# Patient Record
Sex: MALE | Race: BLACK OR AFRICAN AMERICAN | NOT HISPANIC OR LATINO | URBAN - METROPOLITAN AREA
[De-identification: names, ages, dates, MRNs, and addresses within clinical notes are randomized per-mention and may not be internally consistent; named-entity substitution may affect disease eponyms.]

---

## 2017-09-15 ENCOUNTER — OUTPATIENT (OUTPATIENT)
Dept: OUTPATIENT SERVICES | Facility: HOSPITAL | Age: 52
LOS: 1 days | End: 2017-09-15
Payer: COMMERCIAL

## 2017-09-15 VITALS
DIASTOLIC BLOOD PRESSURE: 82 MMHG | HEART RATE: 54 BPM | TEMPERATURE: 98 F | SYSTOLIC BLOOD PRESSURE: 130 MMHG | RESPIRATION RATE: 16 BRPM | HEIGHT: 73 IN | WEIGHT: 229.06 LBS

## 2017-09-15 DIAGNOSIS — Z01.818 ENCOUNTER FOR OTHER PREPROCEDURAL EXAMINATION: ICD-10-CM

## 2017-09-15 DIAGNOSIS — Z98.890 OTHER SPECIFIED POSTPROCEDURAL STATES: Chronic | ICD-10-CM

## 2017-09-15 DIAGNOSIS — M25.561 PAIN IN RIGHT KNEE: ICD-10-CM

## 2017-09-15 DIAGNOSIS — S82.891A OTHER FRACTURE OF RIGHT LOWER LEG, INITIAL ENCOUNTER FOR CLOSED FRACTURE: Chronic | ICD-10-CM

## 2017-09-15 PROCEDURE — 93005 ELECTROCARDIOGRAM TRACING: CPT

## 2017-09-15 PROCEDURE — G0463: CPT

## 2017-09-15 PROCEDURE — 93010 ELECTROCARDIOGRAM REPORT: CPT | Mod: NC

## 2017-09-15 NOTE — H&P PST ADULT - NEUROLOGICAL COMMENTS
Had left sided facial numbness 8/27 went [facility]; Full neuro and blood workup was negative.

## 2017-09-15 NOTE — H&P PST ADULT - HISTORY OF PRESENT ILLNESS
51 yo male presents to [facility] scheduled for right knee arthroscopy on 9/27 with Dr. Arita.  Reports injuring his right knee while working as an . Has had right knee pain since Jan 2017. Rates his pain an 7/10. Denies use of pain meds. 51 yo athletic male presents to PST scheduled for right knee arthroscopy on 9/27 with Dr. Arita.  Reports injuring his right knee while working as an . Has had right knee pain since Jan 2017. Rates his pain an 7/10. Denies use of pain meds.

## 2017-09-15 NOTE — H&P PST ADULT - PMH
MVP (mitral valve prolapse)  "slight"  Right knee pain, unspecified chronicity Facial palsy    MVP (mitral valve prolapse)  "slight"  Right knee pain, unspecified chronicity

## 2017-09-15 NOTE — H&P PST ADULT - ASSESSMENT
scheduled for right knee arthroscopy on 9/27 with Dr. Arita. 51 yo male with right knee pain scheduled for right knee arthroscopy on 9/27 with Dr. Arita.

## 2017-09-15 NOTE — H&P PST ADULT - PSH
S/P colonoscopy with polypectomy Fracture of right ankle  s/p repair 1997  History of knee surgery  Open left knee 1985  S/P arthroscopy of left knee  2013  S/P arthroscopy of right knee  2002  S/P arthroscopy of shoulder  right 2000  S/P colonoscopy with polypectomy

## 2017-09-15 NOTE — H&P PST ADULT - PROBLEM SELECTOR PLAN 2
Labs (CBC, BMP) on chart from 8/27. EKG pending.   MC with Dr. Brian Luis  Pre op instructions reviewed and given. Hold OTC supplements 5 days pre op. Verbalized understanding.

## 2017-09-15 NOTE — H&P PST ADULT - NSANTHOSAYNRD_GEN_A_CORE
No. MANJU screening performed.  STOP BANG Legend: 0-2 = LOW Risk; 3-4 = INTERMEDIATE Risk; 5-8 = HIGH Risk

## 2017-09-20 ENCOUNTER — TRANSCRIPTION ENCOUNTER (OUTPATIENT)
Age: 52
End: 2017-09-20

## 2017-09-26 RX ORDER — SODIUM CHLORIDE 9 MG/ML
3 INJECTION INTRAMUSCULAR; INTRAVENOUS; SUBCUTANEOUS ONCE
Qty: 0 | Refills: 0 | Status: DISCONTINUED | OUTPATIENT
Start: 2017-09-27 | End: 2017-10-12

## 2017-09-26 RX ORDER — SODIUM CHLORIDE 9 MG/ML
1000 INJECTION, SOLUTION INTRAVENOUS
Qty: 0 | Refills: 0 | Status: DISCONTINUED | OUTPATIENT
Start: 2017-09-27 | End: 2017-09-27

## 2017-09-27 ENCOUNTER — OUTPATIENT (OUTPATIENT)
Dept: OUTPATIENT SERVICES | Facility: HOSPITAL | Age: 52
LOS: 1 days | Discharge: HOME | End: 2017-09-27
Payer: COMMERCIAL

## 2017-09-27 ENCOUNTER — TRANSCRIPTION ENCOUNTER (OUTPATIENT)
Age: 52
End: 2017-09-27

## 2017-09-27 ENCOUNTER — RESULT REVIEW (OUTPATIENT)
Age: 52
End: 2017-09-27

## 2017-09-27 VITALS
OXYGEN SATURATION: 100 % | DIASTOLIC BLOOD PRESSURE: 83 MMHG | HEART RATE: 60 BPM | WEIGHT: 229.06 LBS | RESPIRATION RATE: 16 BRPM | HEIGHT: 73 IN | SYSTOLIC BLOOD PRESSURE: 133 MMHG | TEMPERATURE: 98 F

## 2017-09-27 VITALS
DIASTOLIC BLOOD PRESSURE: 67 MMHG | RESPIRATION RATE: 13 BRPM | SYSTOLIC BLOOD PRESSURE: 111 MMHG | OXYGEN SATURATION: 98 % | HEART RATE: 60 BPM

## 2017-09-27 DIAGNOSIS — M25.561 PAIN IN RIGHT KNEE: ICD-10-CM

## 2017-09-27 DIAGNOSIS — Z01.818 ENCOUNTER FOR OTHER PREPROCEDURAL EXAMINATION: ICD-10-CM

## 2017-09-27 DIAGNOSIS — S82.891A OTHER FRACTURE OF RIGHT LOWER LEG, INITIAL ENCOUNTER FOR CLOSED FRACTURE: Chronic | ICD-10-CM

## 2017-09-27 DIAGNOSIS — Z98.890 OTHER SPECIFIED POSTPROCEDURAL STATES: Chronic | ICD-10-CM

## 2017-09-27 PROCEDURE — 88304 TISSUE EXAM BY PATHOLOGIST: CPT

## 2017-09-27 PROCEDURE — 29880 ARTHRS KNE SRG MNISECTMY M&L: CPT | Mod: RT

## 2017-09-27 PROCEDURE — 88304 TISSUE EXAM BY PATHOLOGIST: CPT | Mod: 26

## 2017-09-27 RX ORDER — ONDANSETRON 8 MG/1
4 TABLET, FILM COATED ORAL ONCE
Qty: 0 | Refills: 0 | Status: DISCONTINUED | OUTPATIENT
Start: 2017-09-27 | End: 2017-09-27

## 2017-09-27 RX ORDER — OXYCODONE AND ACETAMINOPHEN 5; 325 MG/1; MG/1
1 TABLET ORAL ONCE
Qty: 0 | Refills: 0 | Status: DISCONTINUED | OUTPATIENT
Start: 2017-09-27 | End: 2017-09-27

## 2017-09-27 RX ORDER — SODIUM CHLORIDE 9 MG/ML
1000 INJECTION, SOLUTION INTRAVENOUS
Qty: 0 | Refills: 0 | Status: DISCONTINUED | OUTPATIENT
Start: 2017-09-27 | End: 2017-09-27

## 2017-09-27 RX ORDER — OXYCODONE AND ACETAMINOPHEN 5; 325 MG/1; MG/1
2 TABLET ORAL ONCE
Qty: 0 | Refills: 0 | Status: DISCONTINUED | OUTPATIENT
Start: 2017-09-27 | End: 2017-09-27

## 2017-09-27 RX ORDER — SODIUM CHLORIDE 9 MG/ML
1000 INJECTION, SOLUTION INTRAVENOUS
Qty: 0 | Refills: 0 | Status: DISCONTINUED | OUTPATIENT
Start: 2017-09-27 | End: 2017-10-12

## 2017-09-27 RX ORDER — OXYCODONE AND ACETAMINOPHEN 5; 325 MG/1; MG/1
1 TABLET ORAL
Qty: 30 | Refills: 0
Start: 2017-09-27

## 2017-09-27 RX ORDER — HYDROMORPHONE HYDROCHLORIDE 2 MG/ML
0.5 INJECTION INTRAMUSCULAR; INTRAVENOUS; SUBCUTANEOUS
Qty: 0 | Refills: 0 | Status: DISCONTINUED | OUTPATIENT
Start: 2017-09-27 | End: 2017-09-27

## 2017-09-27 RX ORDER — CEFAZOLIN SODIUM 1 G
2000 VIAL (EA) INJECTION ONCE
Qty: 0 | Refills: 0 | Status: COMPLETED | OUTPATIENT
Start: 2017-09-27 | End: 2017-09-27

## 2017-09-27 RX ADMIN — HYDROMORPHONE HYDROCHLORIDE 0.5 MILLIGRAM(S): 2 INJECTION INTRAMUSCULAR; INTRAVENOUS; SUBCUTANEOUS at 13:14

## 2017-09-27 RX ADMIN — SODIUM CHLORIDE 30 MILLILITER(S): 9 INJECTION, SOLUTION INTRAVENOUS at 10:30

## 2017-09-27 RX ADMIN — HYDROMORPHONE HYDROCHLORIDE 0.5 MILLIGRAM(S): 2 INJECTION INTRAMUSCULAR; INTRAVENOUS; SUBCUTANEOUS at 13:04

## 2017-09-27 RX ADMIN — SODIUM CHLORIDE 75 MILLILITER(S): 9 INJECTION, SOLUTION INTRAVENOUS at 13:04

## 2017-09-27 NOTE — ASU DISCHARGE PLAN (ADULT/PEDIATRIC). - ACTIVITY LEVEL
Weight bearing as tolerated Right Lower Extremity with assistive devices as needed/no heavy lifting/quiet play/no exercise/no tub baths/elevate extremity/weight bearing as tolerated/no sports/gym

## 2017-09-27 NOTE — BRIEF OPERATIVE NOTE - OPERATION/FINDINGS
Grade IV Changes to Patellofemoral Compartment, Degenerative Medial Meniscus tear, Posterior Horn Lateral Meniscus Fraying

## 2017-09-27 NOTE — ASU PATIENT PROFILE, ADULT - PSH
Fracture of right ankle  s/p repair 1997  History of knee surgery  Open left knee 1985  S/P arthroscopy of left knee  2013  S/P arthroscopy of right knee  2002  S/P arthroscopy of shoulder  right 2000  S/P colonoscopy with polypectomy

## 2017-09-27 NOTE — ASU DISCHARGE PLAN (ADULT/PEDIATRIC). - ITEMS TO FOLLOWUP WITH YOUR PHYSICIAN'S
signs and symptoms of infection. activity restrictions, pain  medications and its indications for use.  Patient and wife verbalized understanding of teachings, and assures compliance

## 2017-09-27 NOTE — ASU DISCHARGE PLAN (ADULT/PEDIATRIC). - NOTIFY
Bleeding that does not stop/Numbness, tingling/Pain not relieved by Medications/Swelling that continues/Numbness, color, or temperature change to extremity/Fever greater than 101

## 2017-09-27 NOTE — ASU DISCHARGE PLAN (ADULT/PEDIATRIC). - MEDICATION SUMMARY - MEDICATIONS TO TAKE
I will START or STAY ON the medications listed below when I get home from the hospital:    acetaminophen-oxyCODONE 325 mg-5 mg oral tablet  -- 1 tab(s) by mouth every 4 hours, As Needed MDD:6 - for severe pain  -- Caution federal law prohibits the transfer of this drug to any person other  than the person for whom it was prescribed.  May cause drowsiness.  Alcohol may intensify this effect.  Use care when operating dangerous machinery.  This prescription cannot be refilled.  This product contains acetaminophen.  Do not use  with any other product containing acetaminophen to prevent possible liver damage.  Using more of this medication than prescribed may cause serious breathing problems.    -- Indication: For Severe pain    lysine 500 mg oral tablet  -- 1 tab(s) by mouth once a day  -- Indication: For per PMD    Therapeutic Multiple Vitamins oral tablet  -- 1 tab(s) by mouth once a day  -- Indication: For per PMD

## 2017-09-27 NOTE — ASU DISCHARGE PLAN (ADULT/PEDIATRIC). - INSTRUCTIONS
Please FU in 10-14 days. Please call for appt. Resume normal diet. Please avoid alcohol when taking pain medications

## 2017-09-27 NOTE — BRIEF OPERATIVE NOTE - PROCEDURE
<<-----Click on this checkbox to enter Procedure Knee arthroscopy, right  09/27/2017  Partial Medial/Lateral Menisectomy, synovectomy, Trochlear Chondroplasty  Active  [id number]

## 2017-09-28 LAB — SURGICAL PATHOLOGY FINAL REPORT - CH: SIGNIFICANT CHANGE UP
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
## 2017-09-30 DIAGNOSIS — M94.261 CHONDROMALACIA, RIGHT KNEE: ICD-10-CM
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
## 2017-09-30 DIAGNOSIS — S83.241A OTHER TEAR OF MEDIAL MENISCUS, CURRENT INJURY, RIGHT KNEE, INITIAL ENCOUNTER: ICD-10-CM

## 2017-09-30 DIAGNOSIS — M17.11 UNILATERAL PRIMARY OSTEOARTHRITIS, RIGHT KNEE: ICD-10-CM
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
## 2017-09-30 DIAGNOSIS — M65.861 OTHER SYNOVITIS AND TENOSYNOVITIS, RIGHT LOWER LEG: ICD-10-CM

## 2017-09-30 DIAGNOSIS — J30.2 OTHER SEASONAL ALLERGIC RHINITIS: ICD-10-CM

## 2017-09-30 DIAGNOSIS — S83.281A OTHER TEAR OF LATERAL MENISCUS, CURRENT INJURY, RIGHT KNEE, INITIAL ENCOUNTER: ICD-10-CM

## 2019-11-30 ENCOUNTER — TRANSCRIPTION ENCOUNTER (OUTPATIENT)
Age: 54
End: 2019-11-30

## 2023-05-08 ENCOUNTER — NON-APPOINTMENT (OUTPATIENT)
Age: 58
End: 2023-05-08

## 2023-05-20 ENCOUNTER — INPATIENT (INPATIENT)
Facility: HOSPITAL | Age: 58
LOS: 2 days | Discharge: HOME | DRG: 123 | End: 2023-05-23
Attending: INTERNAL MEDICINE | Admitting: INTERNAL MEDICINE
Payer: COMMERCIAL

## 2023-05-20 VITALS
TEMPERATURE: 98 F | HEART RATE: 64 BPM | WEIGHT: 207.01 LBS | HEIGHT: 73 IN | DIASTOLIC BLOOD PRESSURE: 94 MMHG | OXYGEN SATURATION: 98 % | RESPIRATION RATE: 18 BRPM | SYSTOLIC BLOOD PRESSURE: 165 MMHG

## 2023-05-20 DIAGNOSIS — Z98.890 OTHER SPECIFIED POSTPROCEDURAL STATES: Chronic | ICD-10-CM

## 2023-05-20 DIAGNOSIS — S82.891A OTHER FRACTURE OF RIGHT LOWER LEG, INITIAL ENCOUNTER FOR CLOSED FRACTURE: Chronic | ICD-10-CM

## 2023-05-20 DIAGNOSIS — H53.9 UNSPECIFIED VISUAL DISTURBANCE: ICD-10-CM

## 2023-05-20 PROBLEM — M25.561 PAIN IN RIGHT KNEE: Chronic | Status: ACTIVE | Noted: 2017-09-15

## 2023-05-20 PROBLEM — I34.1 NONRHEUMATIC MITRAL (VALVE) PROLAPSE: Chronic | Status: ACTIVE | Noted: 2017-09-15

## 2023-05-20 PROBLEM — G51.0 BELL'S PALSY: Chronic | Status: ACTIVE | Noted: 2017-09-15

## 2023-05-20 LAB
ALBUMIN SERPL ELPH-MCNC: 4.1 G/DL — SIGNIFICANT CHANGE UP (ref 3.3–5)
ALP SERPL-CCNC: 85 U/L — SIGNIFICANT CHANGE UP (ref 40–120)
ALT FLD-CCNC: 19 U/L — SIGNIFICANT CHANGE UP (ref 10–45)
ANION GAP SERPL CALC-SCNC: 12 MMOL/L — SIGNIFICANT CHANGE UP (ref 5–17)
APTT BLD: 23.9 SEC — LOW (ref 27.5–35.5)
AST SERPL-CCNC: 18 U/L — SIGNIFICANT CHANGE UP (ref 10–40)
BASOPHILS # BLD AUTO: 0.02 K/UL — SIGNIFICANT CHANGE UP (ref 0–0.2)
BASOPHILS NFR BLD AUTO: 0.2 % — SIGNIFICANT CHANGE UP (ref 0–2)
BILIRUB SERPL-MCNC: 0.3 MG/DL — SIGNIFICANT CHANGE UP (ref 0.2–1.2)
BUN SERPL-MCNC: 15 MG/DL — SIGNIFICANT CHANGE UP (ref 7–23)
CALCIUM SERPL-MCNC: 9.2 MG/DL — SIGNIFICANT CHANGE UP (ref 8.4–10.5)
CHLORIDE SERPL-SCNC: 101 MMOL/L — SIGNIFICANT CHANGE UP (ref 96–108)
CO2 SERPL-SCNC: 25 MMOL/L — SIGNIFICANT CHANGE UP (ref 22–31)
CREAT SERPL-MCNC: 0.98 MG/DL — SIGNIFICANT CHANGE UP (ref 0.5–1.3)
EGFR: 90 ML/MIN/1.73M2 — SIGNIFICANT CHANGE UP
EOSINOPHIL # BLD AUTO: 0.01 K/UL — SIGNIFICANT CHANGE UP (ref 0–0.5)
EOSINOPHIL NFR BLD AUTO: 0.1 % — SIGNIFICANT CHANGE UP (ref 0–6)
GLUCOSE SERPL-MCNC: 122 MG/DL — HIGH (ref 70–99)
HCT VFR BLD CALC: 42.9 % — SIGNIFICANT CHANGE UP (ref 39–50)
HGB BLD-MCNC: 14.4 G/DL — SIGNIFICANT CHANGE UP (ref 13–17)
IMM GRANULOCYTES NFR BLD AUTO: 1 % — HIGH (ref 0–0.9)
INR BLD: 1.14 RATIO — SIGNIFICANT CHANGE UP (ref 0.88–1.16)
LYMPHOCYTES # BLD AUTO: 0.54 K/UL — LOW (ref 1–3.3)
LYMPHOCYTES # BLD AUTO: 5.7 % — LOW (ref 13–44)
MCHC RBC-ENTMCNC: 31.2 PG — SIGNIFICANT CHANGE UP (ref 27–34)
MCHC RBC-ENTMCNC: 33.6 GM/DL — SIGNIFICANT CHANGE UP (ref 32–36)
MCV RBC AUTO: 92.9 FL — SIGNIFICANT CHANGE UP (ref 80–100)
MONOCYTES # BLD AUTO: 0.73 K/UL — SIGNIFICANT CHANGE UP (ref 0–0.9)
MONOCYTES NFR BLD AUTO: 7.7 % — SIGNIFICANT CHANGE UP (ref 2–14)
NEUTROPHILS # BLD AUTO: 8.07 K/UL — HIGH (ref 1.8–7.4)
NEUTROPHILS NFR BLD AUTO: 85.3 % — HIGH (ref 43–77)
NRBC # BLD: 0 /100 WBCS — SIGNIFICANT CHANGE UP (ref 0–0)
PLATELET # BLD AUTO: 301 K/UL — SIGNIFICANT CHANGE UP (ref 150–400)
POTASSIUM SERPL-MCNC: 3.6 MMOL/L — SIGNIFICANT CHANGE UP (ref 3.5–5.3)
POTASSIUM SERPL-SCNC: 3.6 MMOL/L — SIGNIFICANT CHANGE UP (ref 3.5–5.3)
PROT SERPL-MCNC: 7.5 G/DL — SIGNIFICANT CHANGE UP (ref 6–8.3)
PROTHROM AB SERPL-ACNC: 13.1 SEC — SIGNIFICANT CHANGE UP (ref 10.5–13.4)
RBC # BLD: 4.62 M/UL — SIGNIFICANT CHANGE UP (ref 4.2–5.8)
RBC # FLD: 12 % — SIGNIFICANT CHANGE UP (ref 10.3–14.5)
SODIUM SERPL-SCNC: 138 MMOL/L — SIGNIFICANT CHANGE UP (ref 135–145)
WBC # BLD: 9.46 K/UL — SIGNIFICANT CHANGE UP (ref 3.8–10.5)
WBC # FLD AUTO: 9.46 K/UL — SIGNIFICANT CHANGE UP (ref 3.8–10.5)

## 2023-05-20 PROCEDURE — 99285 EMERGENCY DEPT VISIT HI MDM: CPT

## 2023-05-20 PROCEDURE — 70498 CT ANGIOGRAPHY NECK: CPT | Mod: 26

## 2023-05-20 PROCEDURE — 70496 CT ANGIOGRAPHY HEAD: CPT | Mod: 26

## 2023-05-20 RX ORDER — LACTOBACILLUS ACIDOPHILUS 100MM CELL
1 CAPSULE ORAL
Refills: 0 | DISCHARGE

## 2023-05-20 RX ORDER — LANOLIN ALCOHOL/MO/W.PET/CERES
1 CREAM (GRAM) TOPICAL
Qty: 0 | Refills: 0 | DISCHARGE

## 2023-05-20 RX ORDER — ACETAMINOPHEN 500 MG
2 TABLET ORAL
Refills: 0 | DISCHARGE

## 2023-05-20 RX ORDER — PANTOPRAZOLE SODIUM 20 MG/1
40 TABLET, DELAYED RELEASE ORAL
Refills: 0 | Status: DISCONTINUED | OUTPATIENT
Start: 2023-05-20 | End: 2023-05-23

## 2023-05-20 RX ORDER — PREGABALIN 225 MG/1
1 CAPSULE ORAL
Refills: 0 | DISCHARGE

## 2023-05-20 RX ADMIN — Medication 250 MILLIGRAM(S): at 18:13

## 2023-05-20 RX ADMIN — Medication 100 MILLIGRAM(S): at 15:58

## 2023-05-20 NOTE — ED PROVIDER NOTE - ATTENDING APP SHARED VISIT CONTRIBUTION OF CARE
Private Physician Crystal, PCP, Ivania Ernandez Optho/plastics  57y male pmh neg. PT had c/o ha for past 1.5w, Seen by ENT and had elevated crp/esr. Referred to optho for bx  as of 4d ago. Had bx 3 rt temp art. Had prompt relief  w steroids, Now comes to ed c/o rt eye Shade in rt eye lasted approx one min. "I could only see the ceiling out of my rt eye" Private Physician Crystal, PCP, Ivania Ernandez Optho/plastics  57y male pmh neg. PT had c/o ha for past 1.5w, Seen by ENT and had elevated crp/esr. Referred to optho for bx  as of 4d ago. Had bx 3 rt temp art. Had prompt relief  w steroids, Now comes to ed c/o rt eye Shade in rt eye lasted approx one min. "I could only see the ceiling out of my rt eye" PE WDWN male awake alert bandage rt temple. Heent normocephalic atraumatic neck supple chest clear anterior & posterior cv no rubs, gallops or murmurs abd soft +bs no mass guarding neuro no focal defects, CN2-12 intact, power 5/5  all extr gcs 15 speech fluent.  Pratik Thayer MD, Facep

## 2023-05-20 NOTE — CONSULT NOTE ADULT - SUBJECTIVE AND OBJECTIVE BOX
[facility] DEPARTMENT OF OPHTHALMOLOGY - INITIAL ADULT CONSULT  ----------------------------------------------------------------------------------------------------  Evelin De La Cruz MD, PGY-1  -------------------------------------------------------------------------------------------------    HPI: 56 yo M with no significant POHx, currently undergoing work-up for GCA, s/p temporal artery biopsy 5/17 with Dr. Ernandez presenting to the ED for visual loss in the right eye. Pt states that around 10 AM today, he developed a "shade/curtain" over his vision starting from his inferior visual field and extending upwards, excluding the most superior portion of his vision. Adds that episode last around 40 seconds. Reports that he has had previous similar episodes over the past 1-2 years (2-4 episodes). States that 1.5 weeks ago, started developing severe bilateral headache, scalp tenderness and periorbital "pressure" around the right eye. Went to PCP and was found to have elevated inflammatory markers. Was started on prednisone for empiric treatment of GCA on 5/16 with resolution of symptoms. Subsequently had temporal artery biopsy performed on 5/17 with results pending. No personal or family hx of rheumatological diseases.       PAST MEDICAL & SURGICAL HISTORY:  MVP (mitral valve prolapse)  "slight"      Right knee pain, unspecified chronicity      Facial palsy      S/P colonoscopy with polypectomy      S/P arthroscopy of left knee  2013      S/P arthroscopy of right knee  2002      History of knee surgery  Open left knee 1985      S/P arthroscopy of shoulder  right 2000      Fracture of right ankle  s/p repair 1997        Past Ocular History: none   Ophthalmic Medications: none  FAMILY HISTORY: no Fhx of rheumatological disease. No Fhx of glaucoma/ARMD    Social History: no alcohol, tobacco, or illicit substance use    MEDICATIONS  (STANDING):    MEDICATIONS  (PRN):    Allergies & Intolerances:   dust (Sneezing; Rhinorrhea)  Grass; Mold (Sneezing; Rhinorrhea)  No Known Drug Allergies    Review of Systems:  Constitutional: No fever, chills  Eyes: No blurry vision, flashes, floaters, FBS, erythema, discharge, double vision, OU  Neuro: No tremors  Cardiovascular: No chest pain, palpitations  Respiratory: No SOB, no cough  GI: No nausea, vomiting, abdominal pain  : No dysuria  Skin: no rash  Psych: no depression  Endocrine: no polyuria, polydipsia  Heme/lymph: no swelling    VITALS: T(C): 36.8 (05-20-23 @ 12:01)  T(F): 98.2 (05-20-23 @ 12:01), Max: 98.2 (05-20-23 @ 12:01)  HR: 64 (05-20-23 @ 12:01) (64 - 64)  BP: 165/94 (05-20-23 @ 12:01) (165/94 - 165/94)  RR:  (18 - 18)  SpO2:  (98% - 98%)  Wt(kg): --  General: AAO x 3, appropriate mood and affect    Ophthalmology Exam:  Visual acuity (sc): 20/20 OU  Pupils: PERRL OU, no APD  Ttono: 16 OS, 13 OS   Extraocular movements (EOMs): Full OU, no pain, no diplopia  Confrontational Visual Field (CVF): Full OU  Color Plates: 12/12 OU    Pen Light Exam (PLE)  External: Flat OU  Lids/Lashes/Lacrimal Ducts: Flat OU    Sclera/Conjunctiva: W+Q OU  Cornea: Cl OU  Anterior Chamber: D+F OU    Iris: Flat OU  Lens: Cl OU    Fundus Exam: dilated with 1% tropicamide and 2.5% phenylephrine  Approval obtained from primary team for dilation  Patient aware that pupils can remained dilated for at least 4-6 hours  Exam performed with 20D lens    Vitreous: wnl OU  Disc, cup/disc: sharp and pink, 0.4 OU  Macula: wnl OU  Vessels: wnl OU  Periphery: wnl OU    Labs/Imaging:  ***   [facility] DEPARTMENT OF OPHTHALMOLOGY - INITIAL ADULT CONSULT  ----------------------------------------------------------------------------------------------------  Evelin De La Cruz MD, PGY-1  -------------------------------------------------------------------------------------------------    HPI: 58 yo M with no significant POHx, currently undergoing work-up for GCA, s/p temporal artery biopsy 5/17 with Dr. Ernandez presenting to the ED for visual loss in the right eye. Pt states that around 10 AM today, he developed a "shade/curtain" over his vision starting from his inferior visual field and extending upwards, excluding the most superior portion of his vision. Adds that episode last around 40 seconds. Reports that he has had previous similar episodes over the past 1-2 years (2-4 episodes) alternating between both eyes. States that 1.5 weeks ago, started developing severe bilateral headache, scalp tenderness and periorbital "pressure" around the right eye. Went to PCP and was found to have elevated inflammatory markers. Was started on 60mg prednisone for empiric treatment of GCA on 5/16 with resolution of symptoms. Subsequently had temporal artery biopsy performed on 5/17 with results pending. No personal or family hx of rheumatological diseases.       PAST MEDICAL & SURGICAL HISTORY:  MVP (mitral valve prolapse)  "slight"      Right knee pain, unspecified chronicity      Facial palsy      S/P colonoscopy with polypectomy      S/P arthroscopy of left knee  2013      S/P arthroscopy of right knee  2002      History of knee surgery  Open left knee 1985      S/P arthroscopy of shoulder  right 2000      Fracture of right ankle  s/p repair 1997        Past Ocular History: none   Ophthalmic Medications: none  FAMILY HISTORY: no Fhx of rheumatological disease. No Fhx of glaucoma/ARMD    Social History: no alcohol, tobacco, or illicit substance use    MEDICATIONS  (STANDING):    MEDICATIONS  (PRN):    Allergies & Intolerances:   dust (Sneezing; Rhinorrhea)  Grass; Mold (Sneezing; Rhinorrhea)  No Known Drug Allergies    Review of Systems:  Constitutional: No fever, chills  Eyes: No blurry vision, flashes, floaters, FBS, erythema, discharge, double vision, OU  Neuro: No tremors  Cardiovascular: No chest pain, palpitations  Respiratory: No SOB, no cough  GI: No nausea, vomiting, abdominal pain  : No dysuria  Skin: no rash  Psych: no depression  Endocrine: no polyuria, polydipsia  Heme/lymph: no swelling    VITALS: T(C): 36.8 (05-20-23 @ 12:01)  T(F): 98.2 (05-20-23 @ 12:01), Max: 98.2 (05-20-23 @ 12:01)  HR: 64 (05-20-23 @ 12:01) (64 - 64)  BP: 165/94 (05-20-23 @ 12:01) (165/94 - 165/94)  RR:  (18 - 18)  SpO2:  (98% - 98%)  Wt(kg): --  General: AAO x 3, appropriate mood and affect    Ophthalmology Exam:  Visual acuity (sc): 20/20 OU  Pupils: PERRL OU, no APD  Ttono: 16 OS, 13 OS   Extraocular movements (EOMs): Full OU, no pain, no diplopia  Confrontational Visual Field (CVF): Full OU  Color Plates: 12/12 OU    Pen Light Exam (PLE)  External: Flat OU  Lids/Lashes/Lacrimal Ducts: Flat OU    Sclera/Conjunctiva: W+Q OU  Cornea: Cl OU  Anterior Chamber: D+F OU    Iris: Flat OU  Lens: Cl OU    Fundus Exam: dilated with 1% tropicamide and 2.5% phenylephrine  Approval obtained from primary team for dilation  Patient aware that pupils can remained dilated for at least 4-6 hours  Exam performed with 20D lens    Vitreous: wnl OU  Disc, cup/disc: sharp and pink, 0.3 OU  Macula: wnl OU  Vessels: wnl OU  Periphery: wnl OU   [facility] DEPARTMENT OF OPHTHALMOLOGY - INITIAL ADULT CONSULT  ----------------------------------------------------------------------------------------------------  Evelin De La Cruz MD, PGY-1  -------------------------------------------------------------------------------------------------    HPI: 58 yo M with no significant POHx, currently undergoing work-up for GCA, s/p temporal artery biopsy 5/17 with Dr. Ernandez presenting to the ED for visual loss in the right eye. Pt states that around 10 AM today, he developed a "shade/curtain" over his vision starting from his inferior visual field and extending upwards, excluding the most superior portion of his vision. Adds that episode last around 40 seconds. Reports that he has had previous similar episodes over the past 1-2 years (2-4 episodes). States that 1.5 weeks ago, started developing severe bilateral headache, scalp tenderness and periorbital "pressure" around the right eye. Went to PCP and was found to have elevated inflammatory markers. Was started on 60mg prednisone for empiric treatment of GCA on 5/16 with resolution of symptoms. Subsequently had temporal artery biopsy performed on 5/17 with results pending. No personal or family hx of rheumatological diseases.       PAST MEDICAL & SURGICAL HISTORY:  MVP (mitral valve prolapse)  "slight"      Right knee pain, unspecified chronicity      Facial palsy      S/P colonoscopy with polypectomy      S/P arthroscopy of left knee  2013      S/P arthroscopy of right knee  2002      History of knee surgery  Open left knee 1985      S/P arthroscopy of shoulder  right 2000      Fracture of right ankle  s/p repair 1997        Past Ocular History: none   Ophthalmic Medications: none  FAMILY HISTORY: no Fhx of rheumatological disease. No Fhx of glaucoma/ARMD    Social History: no alcohol, tobacco, or illicit substance use    MEDICATIONS  (STANDING):    MEDICATIONS  (PRN):    Allergies & Intolerances:   dust (Sneezing; Rhinorrhea)  Grass; Mold (Sneezing; Rhinorrhea)  No Known Drug Allergies    Review of Systems:  Constitutional: No fever, chills  Eyes: No blurry vision, flashes, floaters, FBS, erythema, discharge, double vision, OU  Neuro: No tremors  Cardiovascular: No chest pain, palpitations  Respiratory: No SOB, no cough  GI: No nausea, vomiting, abdominal pain  : No dysuria  Skin: no rash  Psych: no depression  Endocrine: no polyuria, polydipsia  Heme/lymph: no swelling    VITALS: T(C): 36.8 (05-20-23 @ 12:01)  T(F): 98.2 (05-20-23 @ 12:01), Max: 98.2 (05-20-23 @ 12:01)  HR: 64 (05-20-23 @ 12:01) (64 - 64)  BP: 165/94 (05-20-23 @ 12:01) (165/94 - 165/94)  RR:  (18 - 18)  SpO2:  (98% - 98%)  Wt(kg): --  General: AAO x 3, appropriate mood and affect    Ophthalmology Exam:  Visual acuity (sc): 20/20 OU  Pupils: PERRL OU, no APD  Ttono: 16 OS, 13 OS   Extraocular movements (EOMs): Full OU, no pain, no diplopia  Confrontational Visual Field (CVF): Full OU  Color Plates: 12/12 OU    Pen Light Exam (PLE)  External: Flat OU  Lids/Lashes/Lacrimal Ducts: Flat OU    Sclera/Conjunctiva: W+Q OU  Cornea: Cl OU  Anterior Chamber: D+F OU    Iris: Flat OU  Lens: Cl OU    Fundus Exam: dilated with 1% tropicamide and 2.5% phenylephrine  Approval obtained from primary team for dilation  Patient aware that pupils can remained dilated for at least 4-6 hours  Exam performed with 20D lens    Vitreous: wnl OU  Disc, cup/disc: sharp and pink, 0.3 OU  Macula: wnl OU  Vessels: wnl OU  Periphery: wnl OU   [facility] DEPARTMENT OF OPHTHALMOLOGY - INITIAL ADULT CONSULT  ----------------------------------------------------------------------------------------------------  Evelin De La Cruz MD, PGY-1  -------------------------------------------------------------------------------------------------    HPI: 56 yo M with no significant POHx, currently undergoing work-up for GCA, s/p temporal artery biopsy 5/17 with Dr. Ernandez presenting to the ED for visual loss in the right eye. Pt states that around 10 AM today, he developed a "shade/curtain" over his vision starting from his inferior visual field and extending upwards, excluding the most superior portion of his vision. Adds that episode last around 40 seconds. Reports that he has had previous similar episodes over the past 1-2 years (2-4 episodes). States that 1.5 weeks ago, started developing severe bilateral headache, scalp tenderness and periorbital "pressure" around the right eye. No fevers or jaw claudication. Went to PCP and was found to have elevated inflammatory markers. Was started on 60mg prednisone for empiric treatment of GCA on 5/16 with resolution of symptoms. Subsequently had temporal artery biopsy performed on 5/17 with results pending. No personal or family hx of rheumatological diseases.       PAST MEDICAL & SURGICAL HISTORY:  MVP (mitral valve prolapse)  "slight"      Right knee pain, unspecified chronicity      Facial palsy      S/P colonoscopy with polypectomy      S/P arthroscopy of left knee  2013      S/P arthroscopy of right knee  2002      History of knee surgery  Open left knee 1985      S/P arthroscopy of shoulder  right 2000      Fracture of right ankle  s/p repair 1997        Past Ocular History: none   Ophthalmic Medications: none  FAMILY HISTORY: no Fhx of rheumatological disease. No Fhx of glaucoma/ARMD    Social History: no alcohol, tobacco, or illicit substance use    MEDICATIONS  (STANDING):    MEDICATIONS  (PRN):    Allergies & Intolerances:   dust (Sneezing; Rhinorrhea)  Grass; Mold (Sneezing; Rhinorrhea)  No Known Drug Allergies    Review of Systems:  Constitutional: No fever, chills  Eyes: No blurry vision, flashes, floaters, FBS, erythema, discharge, double vision, OU  Neuro: No tremors  Cardiovascular: No chest pain, palpitations  Respiratory: No SOB, no cough  GI: No nausea, vomiting, abdominal pain  : No dysuria  Skin: no rash  Psych: no depression  Endocrine: no polyuria, polydipsia  Heme/lymph: no swelling    VITALS: T(C): 36.8 (05-20-23 @ 12:01)  T(F): 98.2 (05-20-23 @ 12:01), Max: 98.2 (05-20-23 @ 12:01)  HR: 64 (05-20-23 @ 12:01) (64 - 64)  BP: 165/94 (05-20-23 @ 12:01) (165/94 - 165/94)  RR:  (18 - 18)  SpO2:  (98% - 98%)  Wt(kg): --  General: AAO x 3, appropriate mood and affect    Ophthalmology Exam:  Visual acuity (sc): 20/20 OU  Pupils: PERRL OU, no APD  Ttono: 16 OS, 13 OS   Extraocular movements (EOMs): Full OU, no pain, no diplopia  Confrontational Visual Field (CVF): Full OU  Color Plates: 12/12 OU    Pen Light Exam (PLE)  External: Flat OU  Lids/Lashes/Lacrimal Ducts: Flat OU    Sclera/Conjunctiva: W+Q OU  Cornea: Cl OU  Anterior Chamber: D+F OU    Iris: Flat OU  Lens: Cl OU    Fundus Exam: dilated with 1% tropicamide and 2.5% phenylephrine  Approval obtained from primary team for dilation  Patient aware that pupils can remained dilated for at least 4-6 hours  Exam performed with 20D lens    Vitreous: wnl OU  Disc, cup/disc: sharp and pink, 0.3 OU  Macula: wnl OU  Vessels: wnl OU  Periphery: wnl OU   [facility] DEPARTMENT OF OPHTHALMOLOGY - INITIAL ADULT CONSULT  ----------------------------------------------------------------------------------------------------  Evelin De La Cruz MD, PGY-1  -------------------------------------------------------------------------------------------------    HPI: 58 yo M with no significant POHx, currently undergoing work-up for GCA, s/p temporal artery biopsy 5/17 with Dr. Ernandez presenting to the ED for visual loss in the right eye. Pt states that around 10 AM today, he developed a "shade/curtain" over his vision starting from his inferior visual field and extending upwards, excluding the most superior portion of his vision. Adds that episode lasted around 40 seconds. Reports that he has had previous similar episodes over the past 1-2 years (2-4 episodes). States that 1.5 weeks ago, started developing severe bilateral frontal headache, scalp tenderness and periorbital "pressure" around the right eye. No fevers or jaw claudication. Went to PCP and was found to have elevated inflammatory markers. Was started on 60mg prednisone for empiric treatment of GCA on 5/16 with resolution of symptoms. Subsequently had temporal artery biopsy performed on 5/17 with results pending. No personal or family hx of rheumatological diseases.       PAST MEDICAL & SURGICAL HISTORY:  MVP (mitral valve prolapse)  "slight"      Right knee pain, unspecified chronicity      Facial palsy      S/P colonoscopy with polypectomy      S/P arthroscopy of left knee  2013      S/P arthroscopy of right knee  2002      History of knee surgery  Open left knee 1985      S/P arthroscopy of shoulder  right 2000      Fracture of right ankle  s/p repair 1997        Past Ocular History: none   Ophthalmic Medications: none  FAMILY HISTORY: no Fhx of rheumatological disease. No Fhx of glaucoma/ARMD    Social History: no alcohol, tobacco, or illicit substance use    MEDICATIONS  (STANDING):    MEDICATIONS  (PRN):    Allergies & Intolerances:   dust (Sneezing; Rhinorrhea)  Grass; Mold (Sneezing; Rhinorrhea)  No Known Drug Allergies    Review of Systems:  Constitutional: No fever, chills  Eyes: No blurry vision, flashes, floaters, FBS, erythema, discharge, double vision, OU  Neuro: No tremors  Cardiovascular: No chest pain, palpitations  Respiratory: No SOB, no cough  GI: No nausea, vomiting, abdominal pain  : No dysuria  Skin: no rash  Psych: no depression  Endocrine: no polyuria, polydipsia  Heme/lymph: no swelling    VITALS: T(C): 36.8 (05-20-23 @ 12:01)  T(F): 98.2 (05-20-23 @ 12:01), Max: 98.2 (05-20-23 @ 12:01)  HR: 64 (05-20-23 @ 12:01) (64 - 64)  BP: 165/94 (05-20-23 @ 12:01) (165/94 - 165/94)  RR:  (18 - 18)  SpO2:  (98% - 98%)  Wt(kg): --  General: AAO x 3, appropriate mood and affect    Ophthalmology Exam:  Visual acuity (sc): 20/20 OU  Pupils: PERRL OU, no APD  Ttono: 16 OS, 13 OS   Extraocular movements (EOMs): Full OU, no pain, no diplopia  Confrontational Visual Field (CVF): Full OU  Color Plates: 12/12 OU    Pen Light Exam (PLE)  External: Flat OU  Lids/Lashes/Lacrimal Ducts: Flat OU    Sclera/Conjunctiva: W+Q OU  Cornea: Cl OU  Anterior Chamber: D+F OU    Iris: Flat OU  Lens: Cl OU    Fundus Exam: dilated with 1% tropicamide and 2.5% phenylephrine  Approval obtained from primary team for dilation  Patient aware that pupils can remained dilated for at least 4-6 hours  Exam performed with 20D lens    Vitreous: wnl OU  Disc, cup/disc: sharp and pink, 0.3 OU  Macula: wnl OU  Vessels: wnl OU  Periphery: wnl OU

## 2023-05-20 NOTE — CONSULT NOTE ADULT - ATTENDING COMMENTS
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

## 2023-05-20 NOTE — ED PROVIDER NOTE - NSICDXPASTSURGICALHX_GEN_ALL_CORE_FT
PAST SURGICAL HISTORY:  Fracture of right ankle s/p repair 1997    History of knee surgery Open left knee 1985    S/P arthroscopy of left knee 2013    S/P arthroscopy of right knee 2002    S/P arthroscopy of shoulder right 2000    S/P colonoscopy with polypectomy

## 2023-05-20 NOTE — ED PROVIDER NOTE - PHYSICAL EXAMINATION
GEN: Pt in NAD, A&O x3.  PSYCH: Affect appropriate.  EYES: Sclera white w/o injection. PERRL, EOMI.  ENT: Head NCAT. MMM. Neck supple FROM.  RESP: CTA b/l, no wheezes, rales, or rhonchi.   CARDIAC: RRR, clear distinct S1, S2, no appreciable murmurs.  ABD: Abdomen soft, non-tender. No CVAT b/l.  VASC: 2+ radial and dorsalis pedis pulses b/l. No edema or calf tenderness.  NEURO: CN2-12 grossly intact. Normal and equal sensation and 5/5 strength UE and LE b/l. Pronator drift negative. Normal gross cerebellar function.   SKIN: No rashes on the trunk.

## 2023-05-20 NOTE — CONSULT NOTE ADULT - ASSESSMENT
Assessment: Patient ROSALEE MUELLER is a 56 yo M with no significant POHx, currently undergoing work-up for GCA, s/p temporal artery biopsy 5/17 with Dr. Ernandez presenting to the ED for visual loss in the right eye. Pt states that around 10 AM today, he developed a "shade/curtain" over his vision starting from his inferior visual field and extending upwards, excluding the most superior portion of his vision. Adds that episode last around 40 seconds. Reports that he has had previous similar episodes over the past 1-2 years (2-4 episodes) alternating between both eyes. States that 1.5 weeks ago, started developing severe bilateral headache, scalp tenderness and periorbital "pressure" around the right eye. Went to PCP and was found to have elevated inflammatory markers. Was started on 60mg prednisone for empiric treatment of GCA on 5/16 with resolution of symptoms. Subsequently had temporal artery biopsy performed on 5/17 with results pending. No personal or family hx of rheumatological diseases.   Neurology consulted for evaluation of transient right eye vision loss. Patient reports around 10am, he was staring at screen, he developed a "shade going up" and he could only see the top of his vision field and the bottom part of his vision field is completely dark. This episode lasted 30-40 seconds followed up full recover of vision. He had similar episodes twice before but cannot recall the last episode. Patient denies headache, diplopia, numbness, slurring of speech, facial droop or any weakness of extremities during the episode and in general. He denies jaw claudication or temporal tenderness, fever or chills.  For the past 1.5 weeks, patient had bilateral frontal headache with right orbital pressure and was evaluated by pcp. Pt has elevated outpatient ESR/CRP and is s/p temporal artery biopsy 5/17 with Dr. Ernandez. He recently also had MRI IAC for evaluation of b/l presbycuses and MRI IAC normal.  NIHSS 0  opthalmology evaluation reveals normal eye exam.      Impression: Transient  Right eye upper visual field loss w/o neurological deficit. Would evaluate for cerebrovascular (Amarus fugax) vs rheumatology etiology (GCA) vs toxic/metabolic vs opthalmologic etiologies . Lower suspicion for demyelinating or infectious causes.    Recommendations:  [] agree with CTH, CTA head and neck   [] MRI Brain and orbits w/w/o  [] opthalmology evaluation reveals normal eye exam, recommended solumedrol 125mg and rheumatology consult for further recommendation/workup  [] Can consider labs: ESR, CRP, TSH, T3,T4, Vitamin B1, B6, B12, folate, D 25-hydroxy  [] rest of care per primary team    Plans discussed with neurology attending, Dr. Ellis. Patient to be seen by Dr. Ellis Assessment: Patient ROSALEE MUELLER is a 56 yo M with no significant POHx, currently undergoing work-up for GCA, s/p temporal artery biopsy 5/17 with Dr. Ernandez presenting to the ED for visual loss in the right eye. Pt states that around 10 AM today, he developed a "shade/curtain" over his vision starting from his inferior visual field and extending upwards, excluding the most superior portion of his vision. Adds that episode last around 40 seconds. Reports that he has had previous similar episodes over the past 1-2 years (2-4 episodes) alternating between both eyes. States that 1.5 weeks ago, started developing severe bilateral headache, scalp tenderness and periorbital "pressure" around the right eye. Went to PCP and was found to have elevated inflammatory markers. Was started on 60mg prednisone for empiric treatment of GCA on 5/16 with resolution of symptoms. Subsequently had temporal artery biopsy performed on 5/17 with results pending. No personal or family hx of rheumatological diseases.   Neurology consulted for evaluation of transient right eye vision loss. Patient reports around 10am, he was staring at screen, he developed a "shade going up" and he could only see the top of his vision field and the bottom part of his vision field is completely dark. This episode lasted 30-40 seconds followed up full recover of vision. He had similar episodes twice before but cannot recall the last episode. Patient denies headache, diplopia, numbness, slurring of speech, facial droop or any weakness of extremities during the episode and in general. He denies jaw claudication or temporal tenderness, fever or chills.  For the past 1.5 weeks, patient had bilateral frontal headache with right orbital pressure and was evaluated by pcp. Pt has elevated outpatient ESR/CRP and is s/p temporal artery biopsy 5/17 with Dr. Ernandez. He recently also had MRI IAC for evaluation of b/l presbycuses and MRI IAC normal.  NIHSS 0  opthalmology evaluation reveals normal eye exam.      Impression:  Resolved transient Right eye upper visual field loss w/o neurological deficit. Would evaluate for cerebrovascular (Amarus fugax) vs rheumatology etiology (GCA) vs toxic/metabolic vs opthalmologic etiologies . Lower suspicion for demyelinating or infectious causes.    Recommendations:  [] agree with CTH, CTA head and neck   [] MRI Brain and orbits w/w/o  [] opthalmology evaluation reveals normal eye exam, recommended solumedrol 125mg and rheumatology consult for further recommendation/workup  [] Can consider labs: ESR, CRP, TSH, T3,T4, Vitamin B1, B6, B12, folate, D 25-hydroxy  [] rest of care per primary team    Plans discussed with neurology attending, Dr. Ellis. Patient to be seen by Dr. Ellis Assessment: Patient ROSALEE MUELLER is a 56 yo M with no significant POHx, currently undergoing work-up for GCA, s/p temporal artery biopsy 5/17 with Dr. Ernandez presenting to the ED for visual loss in the right eye. Pt states that around 10 AM today, he developed a "shade/curtain" over his vision starting from his inferior visual field and extending upwards, excluding the most superior portion of his vision. Adds that episode last around 40 seconds. Reports that he has had previous similar episodes over the past 1-2 years (2-4 episodes) alternating between both eyes. States that 1.5 weeks ago, started developing severe bilateral headache, scalp tenderness and periorbital "pressure" around the right eye. Went to PCP and was found to have elevated inflammatory markers. Was started on 60mg prednisone for empiric treatment of GCA on 5/16 with resolution of symptoms. Subsequently had temporal artery biopsy performed on 5/17 with results pending. No personal or family hx of rheumatological diseases.   Neurology consulted for evaluation of transient right eye vision loss. Patient reports around 10am, he was staring at screen, he developed a "shade going up" and he could only see the top of his vision field and the bottom part of his vision field is completely dark. This episode lasted 30-40 seconds followed up full recover of vision. He had similar episodes twice before but cannot recall the last episode. Patient denies headache, diplopia, numbness, slurring of speech, facial droop or any weakness of extremities during the episode and in general. He denies jaw claudication or temporal tenderness, fever or chills.  For the past 1.5 weeks, patient had bilateral frontal headache with right orbital pressure and was evaluated by pcp. Pt has elevated outpatient ESR/CRP and is s/p temporal artery biopsy 5/17 with Dr. Ernandez. He recently also had MRI IAC for evaluation of b/l presbycuses and MRI IAC normal.  NIHSS 0  opthalmology evaluation reveals normal eye exam.      Impression:  Resolved transient Right eye upper visual field loss w/o neurological deficit. Would evaluate for cerebrovascular (Amarus fugax) vs rheumatology etiology (GCA) vs toxic/metabolic vs opthalmologic etiologies . Lower suspicion for demyelinating or infectious causes.    Recommendations:  [] agree with CTH, CTA head and neck   [] MRI Brain and orbits w/w/o  [] opthalmology evaluation reveals normal eye exam, recommended solumedrol 125mg and rheumatology consult for further recommendation/workup  [] Can consider labs: ESR, CRP, hgA1C, Lipid panel, TSH, T3,T4, Vitamin B1, B6, B12, folate, D 25-hydroxy  [] rest of care per primary team    Plans discussed with neurology attending, Dr. Ellis. Patient to be seen by Dr. Ellis Assessment: Patient ROSALEE MUELLER is a 58 yo M with no significant POHx, currently undergoing work-up for GCA, s/p temporal artery biopsy 5/17 with Dr. Ernandez presenting to the ED for visual loss in the right eye. Pt states that around 10 AM today, he developed a "shade/curtain" over his vision starting from his inferior visual field and extending upwards, excluding the most superior portion of his vision. Adds that episode last around 40 seconds. Reports that he has had previous similar episodes over the past 1-2 years (2-4 episodes) alternating between both eyes. States that 1.5 weeks ago, started developing severe bilateral headache, scalp tenderness and periorbital "pressure" around the right eye. Went to PCP and was found to have elevated inflammatory markers. Was started on 60mg prednisone for empiric treatment of GCA on 5/16 with resolution of symptoms. Subsequently had temporal artery biopsy performed on 5/17 with results pending. No personal or family hx of rheumatological diseases.   Neurology consulted for evaluation of transient right eye vision loss. Patient reports around 10am, he was staring at screen, he developed a "shade going up" and he could only see the top of his vision field and the bottom part of his vision field is completely dark. This episode lasted 30-40 seconds followed up full recover of vision. He had similar episodes twice before but cannot recall the last episode. Patient denies headache, diplopia, numbness, slurring of speech, facial droop or any weakness of extremities during the episode and in general. He denies jaw claudication or temporal tenderness, fever or chills.  For the past 1.5 weeks, patient had bilateral frontal headache with right orbital pressure and was evaluated by pcp. Pt has elevated outpatient ESR/CRP and is s/p temporal artery biopsy 5/17 with Dr. Ernandez. He recently also had MRI IAC for evaluation of b/l presbycuses and MRI IAC normal.  NIHSS 0  opthalmology evaluation reveals normal eye exam.      Impression:  Resolved transient Right eye lower visual field loss w/o neurological deficit. Would evaluate for cerebrovascular (Amarus fugax) vs rheumatology etiology (GCA) vs toxic/metabolic vs opthalmologic etiologies . Lower suspicion for demyelinating or infectious causes.    Recommendations:  [] agree with CTH, CTA head and neck   [] MRI Brain and orbits w/w/o  [] opthalmology evaluation reveals normal eye exam, recommended solumedrol 125mg and rheumatology consult for further recommendation/workup  [] Can consider labs: ESR, CRP, hgA1C, Lipid panel, TSH, T3,T4, Vitamin B1, B6, B12, folate, D 25-hydroxy  [] rest of care per primary team    Plans discussed with neurology attending, Dr. Ellis. Patient to be seen by Dr. Ellis Assessment: Patient ROSALEE MUELLER is a 58 yo M with no significant POHx, currently undergoing work-up for GCA, s/p temporal artery biopsy 5/17 with Dr. Ernandez presenting to the ED for visual loss in the right eye. Pt states that around 10 AM today, he developed a "shade/curtain" over his vision starting from his inferior visual field and extending upwards, excluding the most superior portion of his vision. Adds that episode last around 40 seconds. Reports that he has had previous similar episodes over the past 1-2 years (2-4 episodes) alternating between both eyes. States that 1.5 weeks ago, started developing severe bilateral headache, scalp tenderness and periorbital "pressure" around the right eye. Went to PCP and was found to have elevated inflammatory markers. Was started on 60mg prednisone for empiric treatment of GCA on 5/16 with resolution of symptoms. Subsequently had temporal artery biopsy performed on 5/17 with results pending. No personal or family hx of rheumatological diseases.   Neurology consulted for evaluation of transient right eye vision loss. Patient reports around 10am, he was staring at screen, he developed a "shade going up" and he could only see the top of his vision field and the bottom part of his vision field is completely dark. This episode lasted 30-40 seconds followed up full recover of vision. He had similar episodes twice before but cannot recall the last episode. Patient denies headache, diplopia, numbness, slurring of speech, facial droop or any weakness of extremities during the episode and in general. He denies jaw claudication or temporal tenderness, fever or chills.  For the past 1.5 weeks, patient had bilateral frontal headache with right orbital pressure and was evaluated by pcp. Pt has elevated outpatient ESR/CRP and is s/p temporal artery biopsy 5/17 with Dr. Ernandez. He recently also had MRI IAC for evaluation of b/l presbycuses and MRI IAC normal.  NIHSS 0  opthalmology evaluation reveals normal eye exam.      Impression:  Resolved transient Right eye lower visual field loss w/o neurological deficit. Would evaluate for cerebrovascular (Amaurosis fugax) vs rheumatology etiology (GCA) vs toxic/metabolic vs opthalmologic etiologies . Lower suspicion for demyelinating or infectious causes.    Recommendations:  [] agree with CTH, CTA head and neck   [] MRI Brain and orbits w/w/o  [] opthalmology evaluation reveals normal eye exam, recommended solumedrol 125mg and rheumatology consult for further recommendation/workup  [] Can consider labs: ESR, CRP, hgA1C, Lipid panel, TSH, T3,T4, Vitamin B1, B6, B12, folate, D 25-hydroxy  [] rest of care per primary team    Plans discussed with neurology attending, Dr. Ellis. Patient to be seen by Dr. Ellis

## 2023-05-20 NOTE — ED ADULT NURSE NOTE - NSFALLUNIVINTERV_ED_ALL_ED
Bed/Stretcher in lowest position, wheels locked, appropriate side rails in place/Call bell, personal items and telephone in reach/Instruct patient to call for assistance before getting out of bed/chair/stretcher/Non-slip footwear applied when patient is off stretcher/[place] to call system/Physically safe environment - no spills, clutter or unnecessary equipment/Purposeful proactive rounding/Room/bathroom lighting operational, light cord in reach

## 2023-05-20 NOTE — ED PROVIDER NOTE - NSICDXPASTMEDICALHX_GEN_ALL_CORE_FT
PAST MEDICAL HISTORY:  Facial palsy     MVP (mitral valve prolapse) "slight"    Right knee pain, unspecified chronicity

## 2023-05-20 NOTE — ED ADULT NURSE NOTE - NURSING MUSC ROM
[facility]  MULTIPLE SCLEROSIS & NEUROIMMUNOLOGY  FOLLOW-UP VISIT    DISEASE SUMMARY:  Principal neurologic diagnosis: MS  Diagnosis of MS: 6/7/2021  Disease History:  - 5/16/2017: episode of numbness involving the right upper- and lower extremities  - 5/13/2021: episode of numbness involving the right face which progressed to include the right hand; right foot drop; slurred speech  - 6/7/2021: admitted at [facility]; workup included MRI CNS and LP; diagnosed w/ MS; treated with IVMP x3 days followed by oral steroids  - 9/21/2021, 10/5/2021: started Ocrevus  Disease course at onset: relapsing  Current disease course: relapsing  Previous disease therapies:  - none  Current disease therapies:  - ocrelizumab  Symptomatic therapies:  - none  CSF (6/10/2021):  - OCBs: positive (8, 10)  - RBCs: 4  - WBCs: 2  - protein: 47  - glucose: 96:  Other Testing:  - anti-AQP4 Ab (8/2/2021): <1.5  - anti-MOG Ab (8/2/2021): <1:10  MRI head:  - 6/7/2021: simultaneous presence of enhancing and non-enhancing lesions  MRI cervical spine:  - 6/7/2021: no visible lesions  MRI thoracic spine:  - 6/7/2021: no visible lesions    CC: MS    INTERVAL HISTORY:  Dominick Cuadra is a 43 y.o. man with MS.  I last saw him in the [facility] on 7/29/2021.  At that time I recommended he start ocrelizumab, and I ordered the required pre-treatment labs.  Today, he was unaccompanied, and he provided the following interval history:    Dominick tolerated the first doses of Ocrevus well.  He did not experience any significant infusion-related reactions.  Since the time of infusion he has not developed any infections.    He received the Prevnar 13 and SARS-CoV-2 vaccines.  He plans to get the influenza vaccine soon.    Otherwise, Dominick continues to experience numbness (loss of sensation, not painful) involving the right hand.  This is stable.  He has not developed any new or worsened neurologic symptoms.    MEDICATIONS:  Current Outpatient Medications    Medication Sig   • sertraline (ZOLOFT) 100 MG Tab Take 1 tablet by mouth every day.   • vitamin D (VITAMIND D3) 1000 UNIT Tab Take 1 tablet by mouth every day. (Patient not taking: Reported on 6/22/2021)   • sildenafil citrate (VIAGRA) 100 MG tablet Take 100 mg by mouth as needed for Erectile Dysfunction. (Patient not taking: Reported on 6/22/2021)     MEDICAL, SOCIAL, AND FAMILY HISTORY:  There is no change in the patient's ROS or medical, social, or family histories since the previous visit on 7/29/2021.    REVIEW OF SYSTEMS:  A ROS was completed.  Pertinent positives and negatives were included in the HPI, above.  All other systems were reviewed and are negative.    PHYSICAL EXAM:  General/Medical:  - NAD    Neuro:  MENTAL STATUS: awake and alert; no deficits of speech or language; oriented to person, place, and time; affect was appropriate to situation; pleasant, cooperative    CRANIAL NERVES:    II: acuity: NT, fields: NT, pupils: NT, discs: NT    III/IV/VI: versions: grossly intact    V: facial sensation: NT    VII: facial expression: symmetric    VIII: hearing: intact to voice    IX/X: palate: NT    XI: shoulder shrug: NT    XII: tongue: NT    MOTOR:  - bulk: NT  - tone: NT  Upper Extremity Strength  (R/L)    NT   Elbow flexion NT   Elbow extension NT   Shoulder abduction NT     Lower Extremity Strength  (R/L)   Hip flexion NT   Knee extension NT   Knee flexion NT   Ankle plantarflexion NT   Ankle dorsiflexion NT     - pronator drift: NT  - abnormal movements: none    SENSATION:  - light touch: NT  - vibration (R/L, seconds): NT at the great toes  - pinprick: NT  - proprioception: NT  - Romberg: absent    COORDINATION:  - finger to nose: NT  - finger tapping: NT    REFLEXES:  Reflex Right Left   BR NT NT   Biceps NT NT   Triceps NT NT   Patellae NT NT   Achilles NT NT   Toes NT NT     GAIT:  - NT    REVIEW OF IMAGING STUDIES:  No additional imaging since the last visit.    REVIEW OF LABORATORY  STUDIES:  Reviewed.    ASSESSMENT:  Dominick Cuadra is a 43 y.o. man with MS.  He remains clinically stable on Ocrevus, which he has tolerated well so far.  Plan to continue with this line of treatment.  Plan for repeat imaging (which will serve as the new baseline on therapy) around February of next year.    PLAN:  Multiple Sclerosis:  - continue Ocrevus  - influenza vaccine  - repeat MRI brain and cervical spine w/o and w/ contrast around February, 2022    Follow-Up:  - No follow-ups on file.    Signed: Bill Cordova M.D.   full range of motion in all extremities

## 2023-05-20 NOTE — ED PROVIDER NOTE - WET READ LAUNCH FT
There are no Wet Read(s) to document. Bilobed Flap Text: The defect edges were debeveled with a #15 scalpel blade.  Given the location of the defect and the proximity to free margins a bilobe flap was deemed most appropriate.  Using a sterile surgical marker, an appropriate bilobe flap drawn around the defect.    The area thus outlined was incised deep to adipose tissue with a #15 scalpel blade.  The skin margins were undermined to an appropriate distance in all directions utilizing iris scissors.

## 2023-05-20 NOTE — PATIENT PROFILE ADULT - FALL HARM RISK - HARM RISK INTERVENTIONS
Communicate Risk of Fall with Harm to all staff/Monitor gait and stability/Reinforce activity limits and safety measures with patient and family/Tailored Fall Risk Interventions/Use of alarms - bed, chair and/or voice tab/Visual Cue: Yellow wristband and red socks/Bed in lowest position, wheels locked, appropriate side rails in place/Call bell, personal items and telephone in reach/Instruct patient to call for assistance before getting out of bed or chair/Non-slip footwear when patient is out of bed/[place] to call system/Physically safe environment - no spills, clutter or unnecessary equipment/Purposeful Proactive Rounding/Room/bathroom lighting operational, light cord in reach

## 2023-05-20 NOTE — H&P ADULT - NSHPLABSRESULTS_GEN_ALL_CORE
14.4   9.46  )-----------( 301      ( 20 May 2023 13:36 )             42.9       05-20    138  |  101  |  15  ----------------------------<  122<H>  3.6   |  25  |  0.98    Ca    9.2      20 May 2023 13:36    TPro  7.5  /  Alb  4.1  /  TBili  0.3  /  DBili  x   /  AST  18  /  ALT  19  /  AlkPhos  85  05-20                  PT/INR - ( 20 May 2023 13:36 )   PT: 13.1 sec;   INR: 1.14 ratio         PTT - ( 20 May 2023 13:36 )  PTT:23.9 sec    Lactate Trend            CAPILLARY BLOOD GLUCOSE      POCT Blood Glucose.: 118 mg/dL (20 May 2023 13:23)

## 2023-05-20 NOTE — ED ADULT TRIAGE NOTE - CHIEF COMPLAINT QUOTE
Had a biopsy in the rt temporal artery last Wednesday to r/o Temporal Arteritis on Prednisone, had a vision loss for a few seconds today, vision back to normal. Had similar episode a year ago.

## 2023-05-20 NOTE — CONSULT NOTE ADULT - SUBJECTIVE AND OBJECTIVE BOX
Neurology - Consult Note    -  Spectra: [phone] ([facility]), [phone] ([facility])  -    HPI: Patient ROSALEE MUELLER is a 56 yo M with no significant POHx, currently undergoing work-up for GCA, s/p temporal artery biopsy 5/17 with Dr. Ernandez presenting to the ED for visual loss in the right eye. Pt states that around 10 AM today, he developed a "shade/curtain" over his vision starting from his inferior visual field and extending upwards, excluding the most superior portion of his vision. Adds that episode last around 40 seconds. Reports that he has had previous similar episodes over the past 1-2 years (2-4 episodes) alternating between both eyes. States that 1.5 weeks ago, started developing severe bilateral headache, scalp tenderness and periorbital "pressure" around the right eye. Went to PCP and was found to have elevated inflammatory markers. Was started on 60mg prednisone for empiric treatment of GCA on 5/16 with resolution of symptoms. Subsequently had temporal artery biopsy performed on 5/17 with results pending. No personal or family hx of rheumatological diseases.   Neurology consulted for evaluation of transient right eye vision loss. Patient reports around 10am, he was staring at screen, he developed a "shade going up" and he could only see the top of his vision field and the bottom part of his vision field is completely dark. This episode lasted 30-40 seconds followed up full recover of vision. He had similar episodes twice before but cannot recall the last episode. Patient denies headache, numbness, slurring of speech, facial droop or any weakness of extremities during the episode and in general. He denies jaw claudication or temporal tenderness, fever or chills. Pt has elevated outpatient ESR/CRP and is s/p temporal artery biopsy 5/17 with Dr. Ernnadez. He recently also had MRI IAC for evaluation of b/l presbycuses and MRI IAC normal.       Review of Systems:    CONSTITUTIONAL: No fevers or chills  EYES AND ENT: No visual changes or no throat pain   NECK: No pain or stiffness  RESPIRATORY: No hemoptysis or shortness of breath  CARDIOVASCULAR: No chest pain or palpitations  GASTROINTESTINAL: No melena or hematochezia  GENITOURINARY: No dysuria or hematuria  NEUROLOGICAL: +As stated in HPI above  SKIN: No itching, burning, rashes, or lesions   All other review of systems is negative unless indicated above.    Allergies:  dust (Sneezing; Rhinorrhea)  Grass; Mold (Sneezing; Rhinorrhea)  No Known Drug Allergies      PMHx/PSHx/Family Hx: As above, otherwise see below   MVP (mitral valve prolapse)    Right knee pain, unspecified chronicity    Facial palsy        Social Hx:  No current use of tobacco, alcohol, or illicit drugs  Lives with wife    Medications:  MEDICATIONS  (STANDING):    MEDICATIONS  (PRN):      Vitals:  T(C): 36.8 (05-20-23 @ 12:01), Max: 36.8 (05-20-23 @ 12:01)  HR: 64 (05-20-23 @ 12:01) (64 - 64)  BP: 165/94 (05-20-23 @ 12:01) (165/94 - 165/94)  RR: 18 (05-20-23 @ 12:01) (18 - 18)  SpO2: 98% (05-20-23 @ 12:01) (98% - 98%)    Physical Examination:   General - NAD  Cardiovascular - Peripheral pulses palpable, no edema  Eyes -  Fundoscopy not well visualized  Neurologic Exam:  Mental status - Awake, Alert, Oriented to person, place, and time. Speech fluent, repetition and naming intact. Follows simple and complex commands. Attention/concentration, recent and remote memory (including registration and recall), and fund of knowledge intact    Cranial nerves - PERRLA, VFF, EOMI, face sensation (V1-V3) intact b/l, facial strength intact without asymmetry b/l, hearing intact b/l, palate with symmetric elevation, trapezius  5/5 strength b/l, tongue midline on protrusion with full lateral movement    Motor - Normal bulk and tone throughout. No pronator drift.  Strength testing            Deltoid      Biceps      Triceps     Wrist Extension    Wrist Flexion     Interossei         R            5                 5               5                     5                              5                        5                 5  L             5                 5               5                     5                              5                        5                 5              Hip Flexion    Hip Extension    Knee Flexion    Knee Extension    Dorsiflexion    Plantar Flexion  R              5                           5                       5                           5                            5                          5  L              5                           5                        5                           5                            5                          5    Sensation - Light touch/temperature OR pain/vibration intact throughout    DTR's -             Biceps      Triceps     Brachioradialis      Patellar    Ankle    Toes/plantar response  R             2+             2+                  2+                       2+            2+                 Down  L              2+             2+                 2+                        2+           2+                 Down    Coordination - Finger to Nose intact b/l. No tremors appreciated    Gait and station - Normal casual gait. Romberg (-)    Labs:                        14.4   9.46  )-----------( 301      ( 20 May 2023 13:36 )             42.9     05-20    138  |  101  |  15  ----------------------------<  122<H>  3.6   |  25  |  0.98    Ca    9.2      20 May 2023 13:36    TPro  7.5  /  Alb  4.1  /  TBili  0.3  /  DBili  x   /  AST  18  /  ALT  19  /  AlkPhos  85  05-20    CAPILLARY BLOOD GLUCOSE      POCT Blood Glucose.: 118 mg/dL (20 May 2023 13:23)    LIVER FUNCTIONS - ( 20 May 2023 13:36 )  Alb: 4.1 g/dL / Pro: 7.5 g/dL / ALK PHOS: 85 U/L / ALT: 19 U/L / AST: 18 U/L / GGT: x             PT/INR - ( 20 May 2023 13:36 )   PT: 13.1 sec;   INR: 1.14 ratio         PTT - ( 20 May 2023 13:36 )  PTT:23.9 sec  CSF:        Radiology:     Neurology - Consult Note    -  Spectra: [phone] ([facility]), [phone] ([facility])  -    HPI: Patient ROSALEE MUELLER is a 58 yo M with no significant POHx, currently undergoing work-up for GCA, s/p temporal artery biopsy 5/17 with Dr. Ernandez presenting to the ED for visual loss in the right eye. Pt states that around 10 AM today, he developed a "shade/curtain" over his vision starting from his inferior visual field and extending upwards, excluding the most superior portion of his vision. Adds that episode last around 40 seconds. Reports that he has had previous similar episodes over the past 1-2 years (2-4 episodes) alternating between both eyes. States that 1.5 weeks ago, started developing severe bilateral headache, scalp tenderness and periorbital "pressure" around the right eye. Went to PCP and was found to have elevated inflammatory markers. Was started on 60mg prednisone for empiric treatment of GCA on 5/16 with resolution of symptoms. Subsequently had temporal artery biopsy performed on 5/17 with results pending. No personal or family hx of rheumatological diseases.   Neurology consulted for evaluation of transient right eye vision loss. Patient reports around 10am, he was staring at screen, he developed a "shade going up" and he could only see the top of his vision field and the bottom part of his vision field is completely dark. This episode lasted 30-40 seconds followed up full recover of vision. He had similar episodes twice before but cannot recall the last episode. Patient denies headache, diplopia, numbness, slurring of speech, facial droop or any weakness of extremities during the episode and in general. He denies jaw claudication or temporal tenderness, fever or chills.  For the past 1.5 weeks, patient had bilateral frontal headache with right orbital pressure and was evaluated by pcp. Pt has elevated outpatient ESR/CRP and is s/p temporal artery biopsy 5/17 with Dr. Ernandez. He recently also had MRI IAC for evaluation of b/l presbycuses and MRI IAC normal.  NIHSS 0      Review of Systems:    CONSTITUTIONAL: No fevers or chills  EYES AND ENT: No visual changes or no throat pain   NECK: No pain or stiffness  RESPIRATORY: No hemoptysis or shortness of breath  CARDIOVASCULAR: No chest pain or palpitations  GASTROINTESTINAL: No melena or hematochezia  GENITOURINARY: No dysuria or hematuria  NEUROLOGICAL: +As stated in HPI above  SKIN: No itching, burning, rashes, or lesions   All other review of systems is negative unless indicated above.    Allergies:  dust (Sneezing; Rhinorrhea)  Grass; Mold (Sneezing; Rhinorrhea)  No Known Drug Allergies      PMHx/PSHx/Family Hx: As above, otherwise see below   MVP (mitral valve prolapse)    Right knee pain, unspecified chronicity    Facial palsy        Social Hx:  No current use of tobacco, alcohol, or illicit drugs  Lives with wife    Medications:  MEDICATIONS  (STANDING):    MEDICATIONS  (PRN):      Vitals:  T(C): 36.8 (05-20-23 @ 12:01), Max: 36.8 (05-20-23 @ 12:01)  HR: 64 (05-20-23 @ 12:01) (64 - 64)  BP: 165/94 (05-20-23 @ 12:01) (165/94 - 165/94)  RR: 18 (05-20-23 @ 12:01) (18 - 18)  SpO2: 98% (05-20-23 @ 12:01) (98% - 98%)    Physical Examination:   General - NAD  Cardiovascular - Peripheral pulses palpable, no edema  Eyes -  Fundoscopy not well visualized  Neurologic Exam:  Mental status - Awake, Alert, Oriented to person, place, and time. Speech fluent, repetition and naming intact. Follows simple and complex commands. Attention/concentration, recent and remote memory (including registration and recall), and fund of knowledge intact    Cranial nerves - PERRLA, VFF, EOMI, face sensation (V1-V3) intact b/l, facial strength intact without asymmetry b/l, hearing intact b/l, palate with symmetric elevation, trapezius  5/5 strength b/l, tongue midline on protrusion with full lateral movement    Motor - Normal bulk and tone throughout. No pronator drift.  Strength testing            Deltoid      Biceps      Triceps     Wrist Extension    Wrist Flexion     Interossei         R            5                 5               5                     5                              5                        5                 5  L             5                 5               5                     5                              5                        5                 5              Hip Flexion    Hip Extension    Knee Flexion    Knee Extension    Dorsiflexion    Plantar Flexion  R              5                           5                       5                           5                            5                          5  L              5                           5                        5                           5                            5                          5    Sensation - Light touch/temperature OR pain/vibration intact throughout    DTR's -             Biceps      Triceps     Brachioradialis      Patellar    Ankle    Toes/plantar response  R             2+             2+                  2+                       2+            2+                 Down  L              2+             2+                 2+                        2+           2+                 Down    Coordination - Finger to Nose intact b/l. No tremors appreciated    Gait and station - Normal casual gait. Romberg (-)  NIHSS 0    Labs:                        14.4   9.46  )-----------( 301      ( 20 May 2023 13:36 )             42.9     05-20    138  |  101  |  15  ----------------------------<  122<H>  3.6   |  25  |  0.98    Ca    9.2      20 May 2023 13:36    TPro  7.5  /  Alb  4.1  /  TBili  0.3  /  DBili  x   /  AST  18  /  ALT  19  /  AlkPhos  85  05-20    CAPILLARY BLOOD GLUCOSE      POCT Blood Glucose.: 118 mg/dL (20 May 2023 13:23)    LIVER FUNCTIONS - ( 20 May 2023 13:36 )  Alb: 4.1 g/dL / Pro: 7.5 g/dL / ALK PHOS: 85 U/L / ALT: 19 U/L / AST: 18 U/L / GGT: x             PT/INR - ( 20 May 2023 13:36 )   PT: 13.1 sec;   INR: 1.14 ratio         PTT - ( 20 May 2023 13:36 )  PTT:23.9 sec  CSF:        Radiology:     Neurology - Consult Note    -  Spectra: [phone] ([facility]), [phone] ([facility])  -    HPI: Patient ROSALEE MUELLER is a 58 yo M with no significant POHx, currently undergoing work-up for GCA, s/p temporal artery biopsy 5/17 with Dr. Ernandez presenting to the ED for visual loss in the right eye. Pt states that around 10 AM today, he developed a "shade/curtain" over his vision starting from his inferior visual field and extending upwards, excluding the most superior portion of his vision. Adds that episode last around 40 seconds. Reports that he has had previous similar episodes over the past 1-2 years (2-4 episodes) alternating between both eyes. States that 1.5 weeks ago, started developing severe bilateral headache, scalp tenderness and periorbital "pressure" around the right eye. Went to PCP and was found to have elevated inflammatory markers. Was started on 60mg prednisone for empiric treatment of GCA on 5/16 with resolution of symptoms. Subsequently had temporal artery biopsy performed on 5/17 with results pending. No personal or family hx of rheumatological diseases.   Neurology consulted for evaluation of transient right eye vision loss. Patient reports around 10am, he was staring at screen, he developed a "shade going up" and he could only see the top of his vision field and the bottom part of his vision field was completely dark. This episode lasted 30-40 seconds followed up full recover of vision. He had similar episodes twice before but  he cannot recall the last episode. Patient denies headache, diplopia, numbness, slurring of speech, facial droop or any weakness of extremities during the episode and in general. He denies jaw claudication or temporal tenderness, fever or chills.  For the past 1.5 weeks, patient had bilateral frontal headache with right orbital pressure and was evaluated by pcp. Pt has elevated outpatient ESR/CRP and is s/p temporal artery biopsy 5/17 with Dr. Ernandez. He recently also had MRI IAC for evaluation of b/l presbycuses and MRI IAC normal.  NIHSS 0      Review of Systems:    CONSTITUTIONAL: No fevers or chills  EYES AND ENT: No visual changes or no throat pain   NECK: No pain or stiffness  RESPIRATORY: No hemoptysis or shortness of breath  CARDIOVASCULAR: No chest pain or palpitations  GASTROINTESTINAL: No melena or hematochezia  GENITOURINARY: No dysuria or hematuria  NEUROLOGICAL: +As stated in HPI above  SKIN: No itching, burning, rashes, or lesions   All other review of systems is negative unless indicated above.    Allergies:  dust (Sneezing; Rhinorrhea)  Grass; Mold (Sneezing; Rhinorrhea)  No Known Drug Allergies      PMHx/PSHx/Family Hx: As above, otherwise see below   MVP (mitral valve prolapse)    Right knee pain, unspecified chronicity    Facial palsy        Social Hx:  No current use of tobacco, alcohol, or illicit drugs  Lives with wife    Medications:  MEDICATIONS  (STANDING):    MEDICATIONS  (PRN):      Vitals:  T(C): 36.8 (05-20-23 @ 12:01), Max: 36.8 (05-20-23 @ 12:01)  HR: 64 (05-20-23 @ 12:01) (64 - 64)  BP: 165/94 (05-20-23 @ 12:01) (165/94 - 165/94)  RR: 18 (05-20-23 @ 12:01) (18 - 18)  SpO2: 98% (05-20-23 @ 12:01) (98% - 98%)    Physical Examination:   General - NAD  Cardiovascular - Peripheral pulses palpable, no edema  Eyes -  Fundoscopy not well visualized  Neurologic Exam:  Mental status - Awake, Alert, Oriented to person, place, and time. Speech fluent, repetition and naming intact. Follows simple and complex commands. Attention/concentration, recent and remote memory (including registration and recall), and fund of knowledge intact    Cranial nerves - PERRLA, VFF, EOMI, face sensation (V1-V3) intact b/l, facial strength intact without asymmetry b/l, hearing intact b/l, palate with symmetric elevation, trapezius  5/5 strength b/l, tongue midline on protrusion with full lateral movement    Motor - Normal bulk and tone throughout. No pronator drift.  Strength testing            Deltoid      Biceps      Triceps     Wrist Extension    Wrist Flexion     Interossei         R            5                 5               5                     5                              5                        5                 5  L             5                 5               5                     5                              5                        5                 5              Hip Flexion    Hip Extension    Knee Flexion    Knee Extension    Dorsiflexion    Plantar Flexion  R              5                           5                       5                           5                            5                          5  L              5                           5                        5                           5                            5                          5    Sensation - Light touch/temperature OR pain/vibration intact throughout    DTR's -             Biceps      Triceps     Brachioradialis      Patellar    Ankle    Toes/plantar response  R             2+             2+                  2+                       2+            2+                 Down  L              2+             2+                 2+                        2+           2+                 Down    Coordination - Finger to Nose intact b/l. No tremors appreciated    Gait and station - Normal casual gait. Romberg (-)  NIHSS 0    Labs:                        14.4   9.46  )-----------( 301      ( 20 May 2023 13:36 )             42.9     05-20    138  |  101  |  15  ----------------------------<  122<H>  3.6   |  25  |  0.98    Ca    9.2      20 May 2023 13:36    TPro  7.5  /  Alb  4.1  /  TBili  0.3  /  DBili  x   /  AST  18  /  ALT  19  /  AlkPhos  85  05-20    CAPILLARY BLOOD GLUCOSE      POCT Blood Glucose.: 118 mg/dL (20 May 2023 13:23)    LIVER FUNCTIONS - ( 20 May 2023 13:36 )  Alb: 4.1 g/dL / Pro: 7.5 g/dL / ALK PHOS: 85 U/L / ALT: 19 U/L / AST: 18 U/L / GGT: x             PT/INR - ( 20 May 2023 13:36 )   PT: 13.1 sec;   INR: 1.14 ratio         PTT - ( 20 May 2023 13:36 )  PTT:23.9 sec  CSF:        Radiology:     Neurology - Consult Note    -  Spectra: [phone] ([facility]), [phone] ([facility])  -    HPI: Patient ROSALEE MUELLER is a 56 yo M with no significant POHx, currently undergoing work-up for GCA, s/p temporal artery biopsy 5/17 with Dr. Ernandez presenting to the ED for visual loss in the right eye. Pt states that around 10 AM today, he developed a "shade/curtain" over his vision starting from his inferior visual field and extending upwards, excluding the most superior portion of his vision. Adds that episode last around 40 seconds. Reports that he has had previous similar episodes over the past 1-2 years (2-4 episodes) alternating between both eyes. States that 1.5 weeks ago, started developing severe bilateral headache, scalp tenderness and periorbital "pressure" around the right eye. Went to PCP and was found to have elevated inflammatory markers. Was started on 60mg prednisone for empiric treatment of GCA on 5/16 with resolution of symptoms. Subsequently had temporal artery biopsy performed on 5/17 with results pending. No personal or family hx of rheumatological diseases.   Neurology consulted for evaluation of transient right eye vision loss. Patient reports around 10am, he was staring at screen, he developed a "shade going up" and he could only see the top of his vision field and the bottom part of his vision field was completely dark. This episode lasted 30-40 seconds followed up full recover of vision. He had similar episodes twice before but  he cannot recall the last episode. Patient denies headache, diplopia, numbness, slurring of speech, facial droop or any weakness of extremities during the episode and in general. He denies jaw claudication or temporal tenderness, fever or chills.  For the past 1.5 weeks, patient had bilateral frontal headache with right orbital pressure and was evaluated by pcp. Pt has elevated outpatient ESR/CRP and is s/p temporal artery biopsy 5/17 with Dr. Ernandez. He recently also had MRI IAC for evaluation of b/l presbycuses and MRI IAC normal.  NIHSS 0, LKW 9:30am      Review of Systems:    CONSTITUTIONAL: No fevers or chills  EYES AND ENT: No visual changes or no throat pain   NECK: No pain or stiffness  RESPIRATORY: No hemoptysis or shortness of breath  CARDIOVASCULAR: No chest pain or palpitations  GASTROINTESTINAL: No melena or hematochezia  GENITOURINARY: No dysuria or hematuria  NEUROLOGICAL: +As stated in HPI above  SKIN: No itching, burning, rashes, or lesions   All other review of systems is negative unless indicated above.    Allergies:  dust (Sneezing; Rhinorrhea)  Grass; Mold (Sneezing; Rhinorrhea)  No Known Drug Allergies      PMHx/PSHx/Family Hx: As above, otherwise see below   MVP (mitral valve prolapse)    Right knee pain, unspecified chronicity    Facial palsy        Social Hx:  No current use of tobacco, alcohol, or illicit drugs  Lives with wife    Medications:  MEDICATIONS  (STANDING):    MEDICATIONS  (PRN):      Vitals:  T(C): 36.8 (05-20-23 @ 12:01), Max: 36.8 (05-20-23 @ 12:01)  HR: 64 (05-20-23 @ 12:01) (64 - 64)  BP: 165/94 (05-20-23 @ 12:01) (165/94 - 165/94)  RR: 18 (05-20-23 @ 12:01) (18 - 18)  SpO2: 98% (05-20-23 @ 12:01) (98% - 98%)    Physical Examination:   General - NAD  Cardiovascular - Peripheral pulses palpable, no edema  Eyes -  Fundoscopy not well visualized  Neurologic Exam:  Mental status - Awake, Alert, Oriented to person, place, and time. Speech fluent, repetition and naming intact. Follows simple and complex commands. Attention/concentration, recent and remote memory (including registration and recall), and fund of knowledge intact    Cranial nerves - PERRLA, VFF, EOMI, face sensation (V1-V3) intact b/l, facial strength intact without asymmetry b/l, hearing intact b/l, palate with symmetric elevation, trapezius  5/5 strength b/l, tongue midline on protrusion with full lateral movement    Motor - Normal bulk and tone throughout. No pronator drift.  Strength testing            Deltoid      Biceps      Triceps     Wrist Extension    Wrist Flexion     Interossei         R            5                 5               5                     5                              5                        5                 5  L             5                 5               5                     5                              5                        5                 5              Hip Flexion    Hip Extension    Knee Flexion    Knee Extension    Dorsiflexion    Plantar Flexion  R              5                           5                       5                           5                            5                          5  L              5                           5                        5                           5                            5                          5    Sensation - Light touch/temperature OR pain/vibration intact throughout    DTR's -             Biceps      Triceps     Brachioradialis      Patellar    Ankle    Toes/plantar response  R             2+             2+                  2+                       2+            2+                 Down  L              2+             2+                 2+                        2+           2+                 Down    Coordination - Finger to Nose intact b/l. No tremors appreciated    Gait and station - Normal casual gait. Romberg (-)  NIHSS 0    Labs:                        14.4   9.46  )-----------( 301      ( 20 May 2023 13:36 )             42.9     05-20    138  |  101  |  15  ----------------------------<  122<H>  3.6   |  25  |  0.98    Ca    9.2      20 May 2023 13:36    TPro  7.5  /  Alb  4.1  /  TBili  0.3  /  DBili  x   /  AST  18  /  ALT  19  /  AlkPhos  85  05-20    CAPILLARY BLOOD GLUCOSE      POCT Blood Glucose.: 118 mg/dL (20 May 2023 13:23)    LIVER FUNCTIONS - ( 20 May 2023 13:36 )  Alb: 4.1 g/dL / Pro: 7.5 g/dL / ALK PHOS: 85 U/L / ALT: 19 U/L / AST: 18 U/L / GGT: x             PT/INR - ( 20 May 2023 13:36 )   PT: 13.1 sec;   INR: 1.14 ratio         PTT - ( 20 May 2023 13:36 )  PTT:23.9 sec  CSF:        Radiology:

## 2023-05-20 NOTE — ED PROVIDER NOTE - PROGRESS NOTE DETAILS
ophthalmology at bedside. -Helio Leger PA-C Endorsed to Dr Celio Thayer MD, Facep neuro consulted canot make recs on IV steroids which would require admission (as per neuro NP), would need rheum input. I emailed rheum regarding the case. Neuro requesting w/u for possible stroke/TIA in CDU. I mykel CDU PA. -Helio Leger PA-C Patient signed out to me by the prior attending.  The patient's disposition was discussed with the treating team and agreed upon.  Pratik Florian M.D. (attending) patient pending work up for GCA vs embolic ischemia to ophthalmologic branch. Will follow recommendations and potential admission for rheumatology management. d/w CDU provider, pt will require admission for w/u given need for IV steroids for vision change with suspected GCA. I reversed the CDU dispo and d/w Dr. Soriano who accepts admission. d/w rheum who agrees with 250mg solumedrol dose given pt is on prednisone 60mg. rheum will see pt for consult tomorrow.

## 2023-05-20 NOTE — ED PROVIDER NOTE - OBJECTIVE STATEMENT
57-year-old male history of prior facial palsy since resolved, and MVP presents to ED complaining of visual change and right eye while having a temporal artery biopsy on the right.  Patient reports having temporal artery biopsy today, had a transient painless vision loss of the "bottom" of his vision on the right eye, patient reports only being able to see the ceiling with the right eye lasting several seconds before spontaneously resolving.  Patient reports vision is back to normal baseline at this time.  Patient denies any other complaints while in ED including numbness, paresthesias, weakness, chest pain, shortness of breath, abdominal pain, cough, difficulty speaking, walking or swallowing.  Patient reports only intermittent paresthesias in the hands when doing activities such as gripping the handlebars of the bike.  Patient reports having temporal headache for the past week, and elevated inflammatory markers outpatient prompting temporal artery biopsy.  Patient denies known history of rheumatologic disease, fevers, jaw claudication. Pt denies current HA, denies recent similar episodes of visual change, but reports one year ago having similar sxs on the R eye lasting seconds.

## 2023-05-20 NOTE — CHART NOTE - NSCHARTNOTEFT_GEN_A_CORE
Spoke to pt over the phone  started developing severe b/l headache and scalp tenderness (R >L), went to see his PCP at [facility], ESR 53, CRP 87, and was started on prednisone 60mg qD on 5/16 by PCP    had temporal artery biopsy 5/17 with Dr. Ernandez, result pending   headache and scalp tenderness resolved with prednisone      had acute visual loss in the right eye today, lasted for about 40 sec, "floater/shade" over his vision starting from his inferior visual field and extending upwards.   Reports he had previous episodes of similar event  Neuro and opthalmology note reviewed  Agree with CT and MRI imaging, and Solumedrol 250mg x1 for today     full consult note pending tomorrow       Chase Evans, PGY-5  Rheumatology Fellow   Pager: [phone], also available on TEAMS   please enter a full 10 digit number for call back   During weekends, please page on call fellow Spoke to pt over the phone  started developing severe b/l headache and scalp tenderness (R >L) 1.5 weeks ago   went to see his PCP at [facility], ESR 53, CRP 87, and was started on prednisone 60mg qD on 5/16 by PCP    had temporal artery biopsy 5/17 with Dr. Ernandez, result pending   headache and scalp tenderness resolved with prednisone      had acute visual loss in the right eye today, lasted for about 40 sec, "floater/shade" over his vision starting from his inferior visual field and extending upwards.   Reports he had previous episodes of similar event  Neuro and opthalmology note reviewed  Agree with CT and MRI imaging, and Solumedrol 250mg x1 for today     full consult note pending tomorrow       Chase Evans, PGY-5  Rheumatology Fellow   Pager: [phone], also available on TEAMS   please enter a full 10 digit number for call back   During weekends, please page on call fellow

## 2023-05-20 NOTE — ED PROVIDER NOTE - CLINICAL SUMMARY MEDICAL DECISION MAKING FREE TEXT BOX
57-year-old male presents with transient vision loss lasting seconds after recent temporal artery biopsy 3 days ago.  Patient currently back to typical baseline, no focal neurodeficits, no complaints in ED.  Patient evaluated by ophthalmology at bedside.  Patient denied visual changes over the past week while undergoing outpatient work-up for temporal arteritis.  Differential diagnosis includes but is not limited to TIA/CVA, arterial stenosis, complication of recent temporal artery biopsy, atypical migraine, ophthalmologic pathology including CRAO, CRVO, retinal or posterior vitreous detachment.  Plan for labs, CT head, CT angio head and neck, ECG, cardiac monitor, reassess.  Final Optho recs, and likely CDU for further cardiac monitoring and advanced imaging with MRI. -Helio Leger PA-C

## 2023-05-20 NOTE — ED CDU PROVIDER INITIAL DAY NOTE - ATTENDING APP SHARED VISIT CONTRIBUTION OF CARE
***Pratik Florian MD***  I have personally performed a face to face diagnostic evaluation on this patient.  I have reviewed the ACP note and agree with the history, exam, and plan of care, except as noted. Patient will be reassessed and discussed with AM/PM CDU/FT MD who will follow up on labs, analgesia, imaging and disposition as clinically indicated. Please see emergency department provider note.

## 2023-05-20 NOTE — CONSULT NOTE ADULT - ASSESSMENT
Assessment and Recommendations:  58yo male w/ no significant POHx, currently undergoing work-up for GCA, s/p temporal artery biopsy 5/17 with Dr. Ernandez presenting to the ED for transient "shade/curtain-like" visual loss in the right eye lasting 40 seconds with subsequent restoration of vision, previous similar episodes OU over past 1-2 years, currently on pred for empiric treatment of GCA. Concern for GCA induced vision loss vs. embolic etiology.     1. Transient Visual Loss OD iso possible GCA vs embolic etiology  - Visual acuity (sc): 20/20 OU, Pupils: PERRL OU, no APD, Ttono: 16 OS, 13 OS , Extraocular movements (EOMs): Full OU, no pain, no diplopia, Confrontational Visual Field (CVF): Full OU, Color Plates: 12/12 OU  - Pt with transient "shade/curtain-like" visual loss OD this AM, self-resolved  - Reporting similar episodes over the past 1-2 years   - Currently undergoing treatment/workup for GCA; started on pred 60 qD on 5/16 and s/p temporal artery biopsy on 5/17 with Dr. Ernandez (results pending)  - Outpatient labs showing elevated inflammatory markers (ESR 53, CRP 86, normal WBC and plt count)  - Given elevated inflammatory markers, characteristic symptoms of HA/scalp tenderness/transient episode of visual loss, overall strong suspicion for possible GCA   - Will administer 250 solumedrol STAT  - Recommend neurology consult for stroke work-up given possible embolic etiology  - Recommend rheumatology consult for determination of further steroid dosing (ie, admission for further IV steroids)    Patient seen and discussed with Dr. Ferguson. To be discussed with Dr. Chi.    Outpatient follow-up: Patient should follow-up with his/her ophthalmologist or with [facility] Department of Ophthalmology at the address below     [address] [place] [address]. [address]  [place] [address]  [phone]     Assessment and Recommendations:  56yo male w/ no significant POHx, currently undergoing work-up for GCA, s/p temporal artery biopsy 5/17 with Dr. Ernandez presenting to the ED for transient "shade/curtain-like" visual loss in the right eye lasting 40 seconds with subsequent restoration of vision, previous similar episodes OU over past 1-2 years, currently on pred for empiric treatment of GCA. Concern for GCA induced vision loss vs. embolic etiology.     1. Transient Visual Loss OD iso possible GCA vs embolic etiology  - Visual acuity (sc): 20/20 OU, Pupils: PERRL OU, no APD, Ttono: 16 OS, 13 OS , Extraocular movements (EOMs): Full OU, no pain, no diplopia, Confrontational Visual Field (CVF): Full OU, Color Plates: 12/12 OU  - Pt with transient "shade/curtain-like" visual loss OD this AM, self-resolved  - Reporting similar episodes over the past 1-2 years   - Currently undergoing treatment/workup for GCA; started on pred 60 qD on 5/16 and s/p temporal artery biopsy on 5/17 with Dr. Ernnadez (results pending)  - Outpatient labs showing elevated inflammatory markers (ESR 53, CRP 86, normal WBC and plt count)  - Given elevated inflammatory markers, characteristic symptoms of HA/scalp tenderness/transient episode of visual loss, overall strong suspicion for possible GCA   - Will administer 250 solumedrol STAT  - Recommend neurology consult for stroke work-up given possible embolic etiology  - Recommend rheumatology consult for determination of further steroid dosing (ie, admission for further IV steroids)    Patient seen and discussed with Dr. Ferguson and discussed with attending Dr. Chi.    Outpatient follow-up: Patient should follow-up with his/her ophthalmologist or with [facility] Department of Ophthalmology at the address below     [address] [place] [address]. [address]  [place] [address]  [phone]

## 2023-05-20 NOTE — ED ADULT NURSE NOTE - OBJECTIVE STATEMENT
57y male presents to ED c/o 57y male presents to ED c/o vision changes to right eye for a few seconds today. Patient is s/p biopsy in the right temporal artery last Wednesday to r/o Temporal Arteritis. Currently A & O x 4, in no acute distress, vision is back to normal, ambulatory. Denies any headache, dizziness, nausea, or vomiting. Evaluation in progress. Labs sent, stat lock in place. Pending CT head. Reassessment is ongoing

## 2023-05-21 LAB
FOLATE SERPL-MCNC: >20 NG/ML — SIGNIFICANT CHANGE UP
HCV AB S/CO SERPL IA: 0.19 S/CO — SIGNIFICANT CHANGE UP (ref 0–0.99)
HCV AB SERPL-IMP: SIGNIFICANT CHANGE UP
TSH SERPL-MCNC: 0.45 UIU/ML — SIGNIFICANT CHANGE UP (ref 0.27–4.2)
VIT B12 SERPL-MCNC: 1188 PG/ML — SIGNIFICANT CHANGE UP (ref 232–1245)

## 2023-05-21 PROCEDURE — 99222 1ST HOSP IP/OBS MODERATE 55: CPT

## 2023-05-21 PROCEDURE — 99223 1ST HOSP IP/OBS HIGH 75: CPT

## 2023-05-21 RX ORDER — LANOLIN ALCOHOL/MO/W.PET/CERES
3 CREAM (GRAM) TOPICAL ONCE
Refills: 0 | Status: COMPLETED | OUTPATIENT
Start: 2023-05-21 | End: 2023-05-21

## 2023-05-21 RX ORDER — LANOLIN ALCOHOL/MO/W.PET/CERES
3 CREAM (GRAM) TOPICAL AT BEDTIME
Refills: 0 | Status: DISCONTINUED | OUTPATIENT
Start: 2023-05-21 | End: 2023-05-23

## 2023-05-21 RX ADMIN — Medication 3 MILLIGRAM(S): at 01:15

## 2023-05-21 RX ADMIN — PANTOPRAZOLE SODIUM 40 MILLIGRAM(S): 20 TABLET, DELAYED RELEASE ORAL at 06:38

## 2023-05-21 RX ADMIN — Medication 100 MILLIGRAM(S): at 16:26

## 2023-05-21 NOTE — PROGRESS NOTE ADULT - ASSESSMENT
Resolved transient Right eye lower visual field loss w/o neurological deficit.  r/o cerebrovascular (Amaurosis fugax) vs GCA  .    [] MRI Brain and orbits w/w/o  solumedrol as per rheum/neuro/optho  gi proph  d/c when cleared by specialists

## 2023-05-21 NOTE — CONSULT NOTE ADULT - SUBJECTIVE AND OBJECTIVE BOX
ROSALEE MUELLER  [id number]    HISTORY OF PRESENT ILLNESS:  58 yo M with no significant POHx, currently undergoing work-up for GCA, s/p temporal artery biopsy 5/17 with Dr. Ernandez presenting to the ED for visual loss in the right eye.   Pt states that around 10 AM today, he developed a "shade/curtain" over his vision starting from his inferior visual field and extending upwards, excluding the most superior portion of his vision.    episode last around 40 seconds.   Reports that he has had previous similar episodes over the past 1-2 years (2-4 episodes) alternating between both eyes.   States that 1.5 weeks ago, started developing severe bilateral headache, scalp tenderness and periorbital "pressure" around the right eye.   Went to PCP and was found to have elevated inflammatory markers.   Was started on  prednisone for empiric treatment of GCA on 5/16 with resolution of symptoms.         PAST MEDICAL & SURGICAL HISTORY:  MVP (mitral valve prolapse)  "slight"      Right knee pain, unspecified chronicity      Facial palsy      S/P colonoscopy with polypectomy      S/P arthroscopy of left knee  2013      S/P arthroscopy of right knee  2002      History of knee surgery  Open left knee 1985      S/P arthroscopy of shoulder  right 2000      Fracture of right ankle  s/p repair 1997          Review of Systems:  Gen:  No fevers/chills, weight loss  HEENT: No blurry vision, no difficulty swallowing, no oral or nasal ulcers  CVS: No chest pain/palpitations  Resp: No SOB/wheezing  GI: No N/V/C/D/abdominal pain  MSK:  Skin: No new rashes  Neuro: No headaches    MEDICATIONS  (STANDING):  pantoprazole    Tablet 40 milliGRAM(s) Oral before breakfast    MEDICATIONS  (PRN):      Allergies    dust (Sneezing; Rhinorrhea)  Grass; Mold (Sneezing; Rhinorrhea)  No Known Drug Allergies    Intolerances        PERTINENT MEDICATION HISTORY:    SOCIAL HISTORY:  OCCUPATION:  TRAVEL HISTORY:    FAMILY HISTORY:      Vital Signs Last 24 Hrs  T(C): 36.3 (21 May 2023 12:09), Max: 37 (20 May 2023 13:15)  T(F): 97.3 (21 May 2023 12:09), Max: 98.6 (20 May 2023 13:15)  HR: 60 (21 May 2023 12:09) (57 - 65)  BP: 134/77 (21 May 2023 12:09) (128/73 - 158/81)  BP(mean): --  RR: 17 (21 May 2023 12:09) (16 - 19)  SpO2: 98% (21 May 2023 12:09) (96% - 99%)    Parameters below as of 21 May 2023 12:09  Patient On (Oxygen Delivery Method): room air        Physical Exam:  General: No apparent distress  HEENT: EOMI, MMM  CVS: +S1/S2, RRR, no murmurs/rubs/gallops  Resp: CTA b/l. No crackles/wheezing  GI: Soft, NT/ND +BS  MSK:  Neuro: AAOx3  Skin: no visible rashes    LABS:                        14.4   9.46  )-----------( 301      ( 20 May 2023 13:36 )             42.9     05-20    138  |  101  |  15  ----------------------------<  122<H>  3.6   |  25  |  0.98    Ca    9.2      20 May 2023 13:36    TPro  7.5  /  Alb  4.1  /  TBili  0.3  /  DBili  x   /  AST  18  /  ALT  19  /  AlkPhos  85  05-20    PT/INR - ( 20 May 2023 13:36 )   PT: 13.1 sec;   INR: 1.14 ratio    PTT - ( 20 May 2023 13:36 )  PTT:23.9 sec      RADIOLOGY & ADDITIONAL STUDIES:  < from: CT Head No Cont (05.20.23 @ 18:28) >    IMPRESSIONS:    HEAD CT: Mild volume loss, microvascular disease, no acute hemorrhage or   midline shift.    CTA COW:  Patent intracranial circulation without flow limiting stenosis.    CTA NECK: Patent, ECAs, ICAs, no  hemodynamically significant stenosis at    ICA origins by NASCET criteria.  Bilateral vertebral arteries are patent without flow limiting stenosis.      < end of copied text >   ROSALEE MUELLER  [id number]    HISTORY OF PRESENT ILLNESS:  58 yo M with no significant POHx, currently undergoing work-up for GCA, s/p temporal artery biopsy 5/17 with Dr. Ernandez presenting to the ED for visual loss in the right eye.   Pt states that around 10 AM today, he developed a "shade/curtain" over his vision starting from his inferior visual field and extending upwards, excluding the most superior portion of his vision.    episode last around 40 seconds.   Reports that he has had previous similar episodes over the past 1-2 years (2-4 episodes) alternating between both eyes.   States that 1.5 weeks ago, started developing severe bilateral headache, scalp tenderness and periorbital "pressure" around the right eye.   Went to PCP and was found to have elevated inflammatory markers.   Was started on  prednisone for empiric treatment of GCA on 5/16 with resolution of symptoms.       PAST MEDICAL & SURGICAL HISTORY:  MVP (mitral valve prolapse)  "slight"      Right knee pain, unspecified chronicity      Facial palsy      S/P colonoscopy with polypectomy      S/P arthroscopy of left knee  2013      S/P arthroscopy of right knee  2002      History of knee surgery  Open left knee 1985      S/P arthroscopy of shoulder  right 2000      Fracture of right ankle  s/p repair 1997          Review of Systems:  Gen:  No fevers/chills, weight loss  HEENT: No blurry vision, no difficulty swallowing, no oral or nasal ulcers  CVS: No chest pain/palpitations  Resp: No SOB/wheezing  GI: No N/V/C/D/abdominal pain  MSK:  Skin: No new rashes  Neuro: No headaches    MEDICATIONS  (STANDING):  pantoprazole    Tablet 40 milliGRAM(s) Oral before breakfast    MEDICATIONS  (PRN):      Allergies    dust (Sneezing; Rhinorrhea)  Grass; Mold (Sneezing; Rhinorrhea)  No Known Drug Allergies    Intolerances        PERTINENT MEDICATION HISTORY:    SOCIAL HISTORY:  OCCUPATION:  TRAVEL HISTORY:    FAMILY HISTORY:      Vital Signs Last 24 Hrs  T(C): 36.3 (21 May 2023 12:09), Max: 37 (20 May 2023 13:15)  T(F): 97.3 (21 May 2023 12:09), Max: 98.6 (20 May 2023 13:15)  HR: 60 (21 May 2023 12:09) (57 - 65)  BP: 134/77 (21 May 2023 12:09) (128/73 - 158/81)  BP(mean): --  RR: 17 (21 May 2023 12:09) (16 - 19)  SpO2: 98% (21 May 2023 12:09) (96% - 99%)    Parameters below as of 21 May 2023 12:09  Patient On (Oxygen Delivery Method): room air    Physical Exam:  General: NAD   HEENT: EOMI, + s/p R side temporal artery bx, + pulse on the L. side    CVS: +S1/S2, RRR, no murmurs/rubs/gallops  Resp: CTA b/l. No crackles/wheezing  GI: Soft, NT/ND +BS  MSK: no synovitis, joints NTP    Neuro: AAOx3  Skin: no visible rashes    LABS:                        14.4   9.46  )-----------( 301      ( 20 May 2023 13:36 )             42.9     05-20    138  |  101  |  15  ----------------------------<  122<H>  3.6   |  25  |  0.98    Ca    9.2      20 May 2023 13:36    TPro  7.5  /  Alb  4.1  /  TBili  0.3  /  DBili  x   /  AST  18  /  ALT  19  /  AlkPhos  85  05-20    PT/INR - ( 20 May 2023 13:36 )   PT: 13.1 sec;   INR: 1.14 ratio    PTT - ( 20 May 2023 13:36 )  PTT:23.9 sec      RADIOLOGY & ADDITIONAL STUDIES:  < from: CT Head No Cont (05.20.23 @ 18:28) >    IMPRESSIONS:    HEAD CT: Mild volume loss, microvascular disease, no acute hemorrhage or   midline shift.    CTA COW:  Patent intracranial circulation without flow limiting stenosis.    CTA NECK: Patent, ECAs, ICAs, no  hemodynamically significant stenosis at    ICA origins by NASCET criteria.  Bilateral vertebral arteries are patent without flow limiting stenosis.      < end of copied text >      pending MRI    ROSALEE MUELLER  [id number]    HISTORY OF PRESENT ILLNESS:  56 yo M with no significant POHx, currently undergoing work-up for GCA, s/p temporal artery biopsy 5/17 with Dr. Ernandez presenting to the ED for visual loss in the right eye.   Pt states that around 10 AM today, he developed a "shade/curtain" over his vision starting from his inferior visual field and extending upwards, excluding the most superior portion of his vision.    episode last around 40 seconds.   Reports that he has had previous similar episodes over the past 1-2 years (2-4 episodes) alternating between both eyes.   States that 1.5 weeks ago, started developing severe bilateral headache, scalp tenderness and periorbital "pressure" around the right eye.   Went to PCP and was found to have elevated inflammatory markers.   Was started on  prednisone for empiric treatment of GCA on 5/16 with resolution of symptoms.   Denies fever, SOB, CP,  morning stiffness, shoulder pain/thigh pain, rash   no joint erytherma/ swelling     PAST MEDICAL & SURGICAL HISTORY:  MVP (mitral valve prolapse)  "slight"      Right knee pain, unspecified chronicity      Facial palsy      S/P colonoscopy with polypectomy      S/P arthroscopy of left knee  2013      S/P arthroscopy of right knee  2002      History of knee surgery  Open left knee 1985      S/P arthroscopy of shoulder  right 2000      Fracture of right ankle  s/p repair 1997        Review of Systems:  Gen:  No fevers/chills, weight loss  HEENT: No blurry vision, no difficulty swallowing, no oral or nasal ulcers  CVS: No chest pain/palpitations  Resp: No SOB/wheezing  GI: No N/V/C/D/abdominal pain  MSK: no joint complains   Skin: No new rashes  Neuro: No headaches    MEDICATIONS  (STANDING):  pantoprazole    Tablet 40 milliGRAM(s) Oral before breakfast    MEDICATIONS  (PRN):      Allergies    dust (Sneezing; Rhinorrhea)  Grass; Mold (Sneezing; Rhinorrhea)  No Known Drug Allergies    Intolerances        PERTINENT MEDICATION HISTORY:    SOCIAL HISTORY:  OCCUPATION:  TRAVEL HISTORY:    FAMILY HISTORY:      Vital Signs Last 24 Hrs  T(C): 36.3 (21 May 2023 12:09), Max: 37 (20 May 2023 13:15)  T(F): 97.3 (21 May 2023 12:09), Max: 98.6 (20 May 2023 13:15)  HR: 60 (21 May 2023 12:09) (57 - 65)  BP: 134/77 (21 May 2023 12:09) (128/73 - 158/81)  BP(mean): --  RR: 17 (21 May 2023 12:09) (16 - 19)  SpO2: 98% (21 May 2023 12:09) (96% - 99%)    Parameters below as of 21 May 2023 12:09  Patient On (Oxygen Delivery Method): room air    Physical Exam:  General: NAD   HEENT: EOMI, + s/p R side temporal artery bx, + pulse on the L. side    CVS: +S1/S2, RRR, no murmurs/rubs/gallops  Resp: CTA b/l. No crackles/wheezing  GI: Soft, NT/ND +BS  MSK: no synovitis, joints NTP    Neuro: AAOx3  Skin: no visible rashes    LABS:                        14.4   9.46  )-----------( 301      ( 20 May 2023 13:36 )             42.9     05-20    138  |  101  |  15  ----------------------------<  122<H>  3.6   |  25  |  0.98    Ca    9.2      20 May 2023 13:36    TPro  7.5  /  Alb  4.1  /  TBili  0.3  /  DBili  x   /  AST  18  /  ALT  19  /  AlkPhos  85  05-20    PT/INR - ( 20 May 2023 13:36 )   PT: 13.1 sec;   INR: 1.14 ratio    PTT - ( 20 May 2023 13:36 )  PTT:23.9 sec      RADIOLOGY & ADDITIONAL STUDIES:  < from: CT Head No Cont (05.20.23 @ 18:28) >    IMPRESSIONS:    HEAD CT: Mild volume loss, microvascular disease, no acute hemorrhage or   midline shift.    CTA COW:  Patent intracranial circulation without flow limiting stenosis.    CTA NECK: Patent, ECAs, ICAs, no  hemodynamically significant stenosis at    ICA origins by NASCET criteria.  Bilateral vertebral arteries are patent without flow limiting stenosis.      < end of copied text >      pending MRI    ROSALEE MUELLER  [id number]    HISTORY OF PRESENT ILLNESS:  56 yo M with no significant POHx, currently undergoing work-up for GCA, s/p temporal artery biopsy 5/17 with Dr. Ernandez presenting to the ED for visual loss in the right eye.   Pt states that around 10 AM today, he developed a "shade/curtain" over his vision starting from his inferior visual field and extending upwards, excluding the most superior portion of his vision.    episode last around 40 seconds.   Reports that he has had previous similar episodes over the past 1-2 years (2-4 episodes) alternating between both eyes.   States that 1.5 weeks ago, started developing severe bilateral headache, scalp tenderness and periorbital "pressure" around the right eye.   Went to PCP and was found to have elevated inflammatory markers.   Was started on  prednisone for empiric treatment of GCA on 5/16 with resolution of symptoms.   Denies fever, SOB, CP,  morning stiffness, shoulder pain/thigh pain, rash   no joint erythema / swelling     PAST MEDICAL & SURGICAL HISTORY:  MVP (mitral valve prolapse)  "slight"      Right knee pain, unspecified chronicity      Facial palsy      S/P colonoscopy with polypectomy      S/P arthroscopy of left knee  2013      S/P arthroscopy of right knee  2002      History of knee surgery  Open left knee 1985      S/P arthroscopy of shoulder  right 2000      Fracture of right ankle  s/p repair 1997        Review of Systems:  Gen:  No fevers/chills, weight loss  HEENT: as per HPI  CVS: No chest pain/palpitations  Resp: No SOB/wheezing  GI: No N/V/C/D/abdominal pain  MSK: no joint complains   Skin: No new rashes  Neuro: as per HPI, no headache now    MEDICATIONS  (STANDING):  pantoprazole    Tablet 40 milliGRAM(s) Oral before breakfast    MEDICATIONS  (PRN):      Allergies    dust (Sneezing; Rhinorrhea)  Grass; Mold (Sneezing; Rhinorrhea)  No Known Drug Allergies    Intolerances        PERTINENT MEDICATION HISTORY:    SOCIAL HISTORY:  OCCUPATION:  TRAVEL HISTORY:    FAMILY HISTORY:      Vital Signs Last 24 Hrs  T(C): 36.3 (21 May 2023 12:09), Max: 37 (20 May 2023 13:15)  T(F): 97.3 (21 May 2023 12:09), Max: 98.6 (20 May 2023 13:15)  HR: 60 (21 May 2023 12:09) (57 - 65)  BP: 134/77 (21 May 2023 12:09) (128/73 - 158/81)  BP(mean): --  RR: 17 (21 May 2023 12:09) (16 - 19)  SpO2: 98% (21 May 2023 12:09) (96% - 99%)    Parameters below as of 21 May 2023 12:09  Patient On (Oxygen Delivery Method): room air    Physical Exam:  General: NAD   HEENT: EOMI, + s/p R side temporal artery bx, + pulse on the L. side , no scalp tenderness   CVS: +S1/S2, RRR, no murmurs/rubs/gallops  Resp: CTA b/l. No crackles/wheezing  GI: Soft, NT/ ND +BS  MSK: no synovitis, joints NTP    Neuro: AAOx3  Skin: no visible rashes    LABS:                        14.4   9.46  )-----------( 301      ( 20 May 2023 13:36 )             42.9     05-20    138  |  101  |  15  ----------------------------<  122<H>  3.6   |  25  |  0.98    Ca    9.2      20 May 2023 13:36    TPro  7.5  /  Alb  4.1  /  TBili  0.3  /  DBili  x   /  AST  18  /  ALT  19  /  AlkPhos  85  05-20    PT/INR - ( 20 May 2023 13:36 )   PT: 13.1 sec;   INR: 1.14 ratio    PTT - ( 20 May 2023 13:36 )  PTT:23.9 sec      RADIOLOGY & ADDITIONAL STUDIES:  < from: CT Head No Cont (05.20.23 @ 18:28) >    IMPRESSIONS:    HEAD CT: Mild volume loss, microvascular disease, no acute hemorrhage or   midline shift.    CTA COW:  Patent intracranial circulation without flow limiting stenosis.    CTA NECK: Patent, ECAs, ICAs, no  hemodynamically significant stenosis at    ICA origins by NASCET criteria.  Bilateral vertebral arteries are patent without flow limiting stenosis.      < end of copied text >      pending MRI

## 2023-05-21 NOTE — CONSULT NOTE ADULT - ASSESSMENT
57yM with no PMH developed severe b/l headache and scalp tenderness (R >L) 1.5 weeks ago, underwent outpt GCA evaluation, started on prednisone 60mg qD on 5/16 by PCP, presented to ED with transient R. eye vision loss. Rheumatology consulted for GCA management     # GCA - strong suspicion (age, new headache, scalp tenderness, elevated ESR and CRP, score 7/10)    S/p Solumedrol 250mg x1 on 5/20   opthalmology note reviewed- exam: sharp disc   labs from PCP:  ESR 53, CRP 87, repeat ESR here 55 and CRP 18    had temporal artery biopsy 5/17 with Dr. Ernandez, result pending   headache and scalp tenderness resolved with prednisone 60mg     Plan:   - CT head and angio neck negative, MRI pending  - given pt was already on high dose prednisone since 5/16, and normal fundoscopy exam, it is unlikely pt to develop ocular symptom while on treatment for GCA    - can transition to prednisone 60mg qD  today   - start on Bactrim for PCP prophylaxis   - f/u outpt bx result     Case discussed with Dr. Erik Evans, PGY-5  Rheumatology Fellow   Pager: [phone], also available on TEAMS   please enter a full 10 digit number for call back   During weekends, please page on call fellow       57yM with no PMH developed severe b/l headache and scalp tenderness (R >L) 1.5 weeks ago, underwent outpt GCA evaluation, started on prednisone 60mg qD on 5/16 by PCP, presented to ED with transient R. eye vision loss. Rheumatology consulted for GCA management     # GCA - strong suspicion (age, new headache, scalp tenderness, elevated ESR and CRP, score 7/10)   S/p Solumedrol 250mg x1 on 5/20   labs from PCP:  ESR 53, CRP 87, repeat ESR here 55 and CRP 18    had temporal artery biopsy 5/17 with Dr. Ernandez, result pending   headache and scalp tenderness resolved with prednisone 60mg   no jaw claudication, no PMR symptoms     # transient R. vision loss - unclear if it's related to GCA,    had 2 prior episodes, most recent episode >1 year ago   opthalmology note reviewed- exam: sharp disc   given pt was already on high dose prednisone since 5/16, and normal fundoscopy exam, it is less likely to have ocular symptom while on treatment for GCA   agree with neuro workup     Plan:   - CT head and angio neck negative, MRI pending  - pt received Solumedrol 250mg x1 on 5/20, would give 2 additional Solumedrol 250mg today 5/21 and tomorrow 5/22. Pt can transition to prednisone 60mg starting 5/23   - f/u outpt bx result     Case discussed with Dr. Erik Evans, PGY-5  Rheumatology Fellow   Pager: [phone], also available on TEAMS   please enter a full 10 digit number for call back   During weekends, please page on call fellow       57yM with no PMH developed severe b/l headache and scalp tenderness (R >L) 1.5 weeks ago, underwent outpt GCA evaluation, started on prednisone 60mg qD on 5/16 by PCP, presented to ED with transient R. eye vision loss. Rheumatology consulted for GCA management     # GCA - strong suspicion (age, new headache, scalp tenderness, elevated ESR and CRP, score 7/10)   S/p Solumedrol 250mg x1 on 5/20   labs from PCP:  ESR 53, CRP 87, repeat ESR here 55 and CRP 18    had temporal artery biopsy 5/17 with Dr. Ernandez, result pending   headache and scalp tenderness resolved with prednisone 60mg   no jaw claudication, no PMR symptoms     # transient R. vision loss - unclear if it's related to GCA,    had 2 prior episodes, most recent episode >1 year ago   opthalmology note reviewed- exam: sharp disc   given pt was already on high dose prednisone since 5/16, and normal fundoscopy exam, it is less likely to have ocular symptom while on treatment for GCA   agree with neuro workup     Plan:   - CT head and angio neck negative, MRI pending  - pt received Solumedrol 250mg x1 on 5/20, would give 2 additional Solumedrol 250mg today 5/21 and tomorrow 5/22, then transition to prednisone 60mg starting 5/23   - f/u outpt bx result     Case discussed with Dr. Erik Evans, PGY-5  Rheumatology Fellow   Pager: [phone], also available on TEAMS   please enter a full 10 digit number for call back   During weekends, please page on call fellow

## 2023-05-22 PROCEDURE — 99221 1ST HOSP IP/OBS SF/LOW 40: CPT

## 2023-05-22 PROCEDURE — 70553 MRI BRAIN STEM W/O & W/DYE: CPT | Mod: 26

## 2023-05-22 PROCEDURE — 70543 MRI ORBT/FAC/NCK W/O &W/DYE: CPT | Mod: 26

## 2023-05-22 RX ADMIN — Medication 100 MILLIGRAM(S): at 05:36

## 2023-05-22 RX ADMIN — PANTOPRAZOLE SODIUM 40 MILLIGRAM(S): 20 TABLET, DELAYED RELEASE ORAL at 05:36

## 2023-05-22 NOTE — PROGRESS NOTE ADULT - SUBJECTIVE AND OBJECTIVE BOX
DATE OF SERVICE: 05-22-23 @ 13:02  CHIEF COMPLAINT:Patient is a 57y old  Male who presents with a chief complaint of   	        PAST MEDICAL & SURGICAL HISTORY:  MVP (mitral valve prolapse)  "slight"      Right knee pain, unspecified chronicity      Facial palsy      S/P colonoscopy with polypectomy      S/P arthroscopy of left knee  2013      S/P arthroscopy of right knee  2002      History of knee surgery  Open left knee 1985      S/P arthroscopy of shoulder  right 2000      Fracture of right ankle  s/p repair 1997              REVIEW OF SYSTEMS:  CONSTITUTIONAL: No fever, weight loss, or fatigue  EYES: No eye pain, visual disturbances, or discharge  NECK: No pain or stiffness  RESPIRATORY: No cough, wheezing, chills or hemoptysis; No Shortness of Breath  CARDIOVASCULAR: No chest pain, palpitations, passing out, dizziness, or leg swelling  GASTROINTESTINAL: No abdominal or epigastric pain. No nausea, vomiting, or hematemesis; No diarrhea or constipation. No melena or hematochezia.  GENITOURINARY: No dysuria, frequency, hematuria, or incontinence  NEUROLOGICAL: No headaches, memory loss,     Medications:  MEDICATIONS  (STANDING):  pantoprazole    Tablet 40 milliGRAM(s) Oral before breakfast    MEDICATIONS  (PRN):  melatonin 3 milliGRAM(s) Oral at bedtime PRN Insomnia    	    PHYSICAL EXAM:  T(C): 37.2 (05-22-23 @ 04:17), Max: 37.2 (05-22-23 @ 04:17)  HR: 61 (05-22-23 @ 04:17) (56 - 64)  BP: 135/80 (05-22-23 @ 04:17) (122/73 - 136/75)  RR: 19 (05-22-23 @ 04:17) (18 - 19)  SpO2: 98% (05-22-23 @ 04:17) (96% - 98%)  Wt(kg): --  I&O's Summary    21 May 2023 07:01  -  22 May 2023 07:00  --------------------------------------------------------  IN: 890 mL / OUT: 0 mL / NET: 890 mL        Appearance: Normal	  HEENT:   Normal oral mucosa, PERRL, EOMI	    Cardiovascular: Normal S1 S2, No JVD,   Respiratory: Lungs clear to auscultation	  Psychiatry: A & O x 3, Mood & affect appropriate  Gastrointestinal:  Soft, Non-tender, + BS	  S	  Neurologic: Non-focal  Extremities: Normal range of motion,     TELEMETRY: 	    ECG:  	  RADIOLOGY:  OTHER: 	  	  LABS:	 	    CARDIAC MARKERS:                                14.4   9.46  )-----------( 301      ( 20 May 2023 13:36 )             42.9     05-20    138  |  101  |  15  ----------------------------<  122<H>  3.6   |  25  |  0.98    Ca    9.2      20 May 2023 13:36    TPro  7.5  /  Alb  4.1  /  TBili  0.3  /  DBili  x   /  AST  18  /  ALT  19  /  AlkPhos  85  05-20    proBNP:   Lipid Profile:   HgA1c:   TSH:

## 2023-05-22 NOTE — PROGRESS NOTE ADULT - ASSESSMENT
Resolved transient Right eye lower visual field loss w/o neurological deficit.  r/o cerebrovascular (Amaurosis fugax) vs GCA  .    [] MRI Brain and orbits w/w/o today  solumedrol as per rheum/neuro/optho  gi proph  d/c when cleared by specialists

## 2023-05-22 NOTE — PROGRESS NOTE ADULT - ASSESSMENT
Assessment and Recommendations:  58yo male w/ no significant POHx, currently undergoing work-up for GCA, s/p temporal artery biopsy 5/17 with Dr. Ernandez presenting to the ED for transient "shade/curtain-like" visual loss in the right eye lasting 40 seconds with subsequent restoration of vision, previous similar episodes OU over past 1-2 years, currently on pred for empiric treatment of GCA. Concern for GCA induced vision loss vs. embolic etiology.     # Transient Visual Loss OD iso possible GCA vs embolic etiology  - Visual acuity intact, no signs of optic nerve dysfunction  - Pt with transient "shade/curtain-like" visual loss OD self-resolved. Reporting similar episodes over the past 1-2 years   - Currently undergoing treatment/workup for GCA; started on pred 60 qD on 5/16 and s/p temporal artery biopsy on 5/17 with Dr. Ernandez (results pending)  - Outpatient labs showing elevated inflammatory markers (ESR 53, CRP 86, normal WBC and plt count)  - Given elevated inflammatory markers, characteristic symptoms of HA/scalp tenderness/transient episode of visual loss, overall strong suspicion for possible GCA   -  stroke work-up per neurology  - appreciate rheumatology input for further steroids vs. steroid-sparing agent    Pt seen and discussed with Dr. Rosario, attending.     Outpatient follow-up: Patient should follow-up with his/her ophthalmologist or with [facility] Department of Ophthalmology at the address below     [address] [place] [address]. [address]  [place] [address]  [phone]

## 2023-05-22 NOTE — PROGRESS NOTE ADULT - SUBJECTIVE AND OBJECTIVE BOX
[facility] DEPARTMENT OF OPHTHALMOLOGY  ------------------------------------------------------------------------------  Wendie Chamberlain MD PGY-3  ------------------------------------------------------------------------------    Interval History: No acute events overnight. Today patient denying blurred vision, eye pain, flashes, floaters, FBS, erythema, or discharge.     MEDICATIONS  (STANDING):  pantoprazole    Tablet 40 milliGRAM(s) Oral before breakfast    MEDICATIONS  (PRN):  melatonin 3 milliGRAM(s) Oral at bedtime PRN Insomnia      VITALS: T(C): 36.7 (05-22-23 @ 15:13)  T(F): 98.1 (05-22-23 @ 15:13), Max: 98.9 (05-22-23 @ 04:17)  HR: 69 (05-22-23 @ 15:13) (56 - 69)  BP: 133/83 (05-22-23 @ 15:13) (133/83 - 136/75)  RR:  (18 - 19)  SpO2:  (96% - 98%)  Wt(kg): --  General: AAOx3    Ophthalmology Exam:  Visual acuity (sc): 20/20 OU  Pupils: PERRL OU, no APD  Extraocular movements (EOMs): Full OU, no pain, no diplopia      Pen Light Exam (PLE)  External: Flat OU  Lids/Lashes/Lacrimal Ducts: Flat OU    Sclera/Conjunctiva: W+Q OU  Cornea: Cl OU  Anterior Chamber: D+F OU    Iris: Flat OU  Lens: Cl OU    Fundus Exam: dilated with 1% tropicamide and 2.5% phenylephrine  Approval obtained from primary team for dilation  Patient aware that pupils can remained dilated for at least 4-6 hours  Exam performed with 20D lens    Vitreous: wnl OU  Disc, cup/disc: sharp and pink, 0.3 OU  Macula: wnl OU  Vessels: wnl OU  Periphery: wnl OU

## 2023-05-22 NOTE — CHART NOTE - NSCHARTNOTEFT_GEN_A_CORE
Seen and evaluated patient. Continues to do well on steroids. Will attempt to get biopsy report tomorrow. To switch to prednisone 60mg qD on 5/23 (ordered)    Chandu Hutchinson MD, PGY-4  Rheumatology Fellow  Reachable on TEAMS

## 2023-05-23 ENCOUNTER — NON-APPOINTMENT (OUTPATIENT)
Age: 58
End: 2023-05-23

## 2023-05-23 ENCOUNTER — TRANSCRIPTION ENCOUNTER (OUTPATIENT)
Age: 58
End: 2023-05-23

## 2023-05-23 VITALS
DIASTOLIC BLOOD PRESSURE: 87 MMHG | TEMPERATURE: 99 F | SYSTOLIC BLOOD PRESSURE: 136 MMHG | RESPIRATION RATE: 18 BRPM | OXYGEN SATURATION: 99 %

## 2023-05-23 PROCEDURE — 82746 ASSAY OF FOLIC ACID SERUM: CPT

## 2023-05-23 PROCEDURE — 99285 EMERGENCY DEPT VISIT HI MDM: CPT

## 2023-05-23 PROCEDURE — 86140 C-REACTIVE PROTEIN: CPT

## 2023-05-23 PROCEDURE — 70496 CT ANGIOGRAPHY HEAD: CPT | Mod: MA

## 2023-05-23 PROCEDURE — 85025 COMPLETE CBC W/AUTO DIFF WBC: CPT

## 2023-05-23 PROCEDURE — 96366 THER/PROPH/DIAG IV INF ADDON: CPT

## 2023-05-23 PROCEDURE — 70498 CT ANGIOGRAPHY NECK: CPT | Mod: MA

## 2023-05-23 PROCEDURE — 99233 SBSQ HOSP IP/OBS HIGH 50: CPT

## 2023-05-23 PROCEDURE — 85730 THROMBOPLASTIN TIME PARTIAL: CPT

## 2023-05-23 PROCEDURE — 86803 HEPATITIS C AB TEST: CPT

## 2023-05-23 PROCEDURE — 82962 GLUCOSE BLOOD TEST: CPT

## 2023-05-23 PROCEDURE — 85610 PROTHROMBIN TIME: CPT

## 2023-05-23 PROCEDURE — 70543 MRI ORBT/FAC/NCK W/O &W/DYE: CPT

## 2023-05-23 PROCEDURE — 80053 COMPREHEN METABOLIC PANEL: CPT

## 2023-05-23 PROCEDURE — 36415 COLL VENOUS BLD VENIPUNCTURE: CPT

## 2023-05-23 PROCEDURE — 82607 VITAMIN B-12: CPT

## 2023-05-23 PROCEDURE — C8929: CPT

## 2023-05-23 PROCEDURE — 84443 ASSAY THYROID STIM HORMONE: CPT

## 2023-05-23 PROCEDURE — 70553 MRI BRAIN STEM W/O & W/DYE: CPT

## 2023-05-23 PROCEDURE — 93306 TTE W/DOPPLER COMPLETE: CPT | Mod: 26

## 2023-05-23 PROCEDURE — 70450 CT HEAD/BRAIN W/O DYE: CPT | Mod: MA

## 2023-05-23 PROCEDURE — 96365 THER/PROPH/DIAG IV INF INIT: CPT

## 2023-05-23 PROCEDURE — 85652 RBC SED RATE AUTOMATED: CPT

## 2023-05-23 RX ORDER — PANTOPRAZOLE SODIUM 20 MG/1
1 TABLET, DELAYED RELEASE ORAL
Qty: 0 | Refills: 0 | DISCHARGE
Start: 2023-05-23

## 2023-05-23 RX ORDER — PANTOPRAZOLE SODIUM 20 MG/1
1 TABLET, DELAYED RELEASE ORAL
Qty: 30 | Refills: 0
Start: 2023-05-23 | End: 2023-06-21

## 2023-05-23 RX ADMIN — Medication 60 MILLIGRAM(S): at 06:29

## 2023-05-23 RX ADMIN — PANTOPRAZOLE SODIUM 40 MILLIGRAM(S): 20 TABLET, DELAYED RELEASE ORAL at 06:30

## 2023-05-23 RX ADMIN — Medication 3 MILLIGRAM(S): at 01:13

## 2023-05-23 NOTE — DISCHARGE NOTE PROVIDER - NSDCFUADDAPPT_GEN_ALL_CORE_FT
APPTS ARE READY TO BE MADE: [x ] YES    Best Family or Patient Contact (if needed):    Additional Information about above appointments (if needed):    1: Dr. Gilbert 1-2 weeks  2: Rheumatology for biopsy results 1 week  3: opthalmology 1-2 weeks    Other comments or requests:    APPTS ARE READY TO BE MADE: [x ] YES    Best Family or Patient Contact (if needed):    Additional Information about above appointments (if needed):    1: Dr. Gilbert 1-2 weeks  2: Rheumatology for biopsy results 1 week  3: opthalmology 1-2 weeks    Other comments or requests:     Patient was provided with follow up request details and was advised to call to schedule follow up within specified time frame.     Patient is scheduled with Nurse Practitioner Kamila Rodríguez (Neurology) 6/6/23 8:00AM at [facility]    Patient is scheduled with Dr. Valencia 6/2/23 9:30AM at [address] [facility]

## 2023-05-23 NOTE — PROGRESS NOTE ADULT - ASSESSMENT
Resolved transient Right eye lower visual field loss w/o neurological deficit.  r/o cerebrovascular (Amaurosis fugax) vs GCA  .    [] MRI Brain and orbits noted  as per neuro clear to go   solumedrol as per rheum/neuro/optho  changed to po prednisone today  gi proph  d/c  planning

## 2023-05-23 NOTE — PROGRESS NOTE ADULT - SUBJECTIVE AND OBJECTIVE BOX
NEUROLOGY FOLLOW-UP CONSULT NOTE    RFC: transient right eye visual loss    Interval history: No acute neurologic events overnight. Patient reports feeling well today, denies headache, visual loss, blurry vision, or any weakness/ sensory changes.    Meds:  MEDICATIONS  (STANDING):  pantoprazole    Tablet 40 milliGRAM(s) Oral before breakfast  predniSONE   Tablet 60 milliGRAM(s) Oral daily    MEDICATIONS  (PRN):  melatonin 3 milliGRAM(s) Oral at bedtime PRN Insomnia      PMHx/PSHx/FHx/SHx:  Visual disturbance    MVP (mitral valve prolapse)    Right knee pain, unspecified chronicity    Visual changes    S/P colonoscopy with polypectomy    S/P arthroscopy of left knee    S/P arthroscopy of right knee    History of knee surgery    S/P arthroscopy of shoulder    Fracture of right ankle    VISION LOSS RIGHT EYE      Allergies:  dust (Sneezing; Rhinorrhea)  Grass; Mold (Sneezing; Rhinorrhea)  No Known Drug Allergies      ROS: All systems negative except as documented in Interval history    O:  T(C): 37.1 (05-23-23 @ 11:32), Max: 37.1 (05-23-23 @ 11:32)  HR: 64 (05-23-23 @ 08:17) (61 - 69)  BP: 136/87 (05-23-23 @ 11:32) (113/65 - 143/88)  RR: 18 (05-23-23 @ 11:32) (18 - 19)  SpO2: 99% (05-23-23 @ 11:32) (98% - 99%)    Focused neurologic exam:  MS - AAO x3, speech fluent, rep/naming intact, follows commands, attn/conc/recent and remote memory/fund of knowledge WNL  CN - PERRLA, EOMI, VFF, face sens/str/hearing WNL b/l, tongue/palate midline, trap 5/5 b/l  Motor - Normal bulk/tone, 5/5 all  Sens - LT/temp/vib intact all  DTR's - 2+ all and downgoing b/l plantar response  Coord - FtN intact b/l  Gait and station - Normal casual gait. Able to heel, toe, tandem. Romberg (-)    Pertinent labs/studies:    LABS:  cret        Radiology:  < from: CT Head No Cont and CTA head and neck (05.20.23 @ 18:28) >    FINDINGS:    CTA [place]:  ANTERIOR CIRCULATION  ICA  CAVERNOUS, SUPRACLINOID, BIFURCATION SEGMENTS: Patent without flow   limiting stenosis.    ANTERIOR CEREBRAL ARTERIES: Bilateral A1, anterior communicating and A2   anterior cerebral arteries are unremarkable in course and caliber without   flow limiting stenosis.    MIDDLE CEREBRAL ARTERIES: Patent bilateral M1, M2, and distal MCA   branches without flow limiting stenosis.    Bilateral ophthalmic arteries patent.    POSTERIOR CIRCULATION:  VERTEBRAL ARTERIES: Patent without flow limiting stenosis.  BASILAR ARTERY: Patent no flow limiting stenosis.  POSTERIOR CEREBRAL ARTERIES: Patent without flow limiting stenosis.      CTA NECK:  GREAT VESSELS: Visualized segments are patent, no flow limiting stenosis.    COMMON CAROTID ARTERIES:  RIGHT CCA: Patent without flow limiting stenosis  LEFT CCA: Patent without flow limiting stenosis    CAROTID BULBS:  RIGHT CB: Patentwithout flow limiting stenosis  LEFT CB: Patent without flow limiting stenosis    INTERNAL CAROTID ARTERIES:  RIGHT ICA: Patent no evidence for any hemodynamically significant   stenosis at the ICA origin by NASCET criteria.  LEFT ICA: Patent no evidence for any hemodynamically significant stenosis   at the ICA origin by NASCET criteria.    VERTEBRAL ARTERIES:  RIGHT VA: Patent no evidence for any flow limiting stenosis.  LEFT VA: Patent no evidence for any flow limiting stenosis. Originates   directly from the aortic arch.      SOFT TISSUES: Unremarkable  BONES: Unremarkable    IMPRESSIONS:    HEAD CT: Mild volume loss, microvascular disease, no acute hemorrhage or   midline shift.    CTA COW:  Patent intracranial circulation without flow limiting stenosis.    CTA NECK: Patent, ECAs, ICAs, no  hemodynamically significant stenosis at    ICA origins by NASCET criteria.  Bilateral vertebral arteries are patent without flow limiting stenosis.    < end of copied text >  < from: MR Head and orbits w/wo IV Cont (05.22.23 @ 22:24) >  FINDINGS:    MRI ORBITS:    Globes: Normal. The lenses are normally located.  Retrobulbar fat: Normal. No retrobulbar mass.  Extraocular muscles: Normal in size.  Optic nerves, chiasm and tracts: Normal caliber and signal. No abnormal   enhancement.    MRI BRAIN:    Fat-saturated postcontrast images demonstrate no abnormal enhancement or   mural thickening of the bilateral superficial temporal vessels.    No acute infarction, intracranial hemorrhage or mass lesion.    Small subcortical focus of white matter FLAIR hyperintensity in the left   frontal lobe. Differential diagnosis includes postinfectious/inflammatory   etiology (i.e., Lyme disease), migraine, vasculitis, demyelination, and   microvascular ischemic changes. If clinically indicated, follow-up MRI   may be obtained for further evaluation.    No hydrocephalus. No extra-axial fluid collections. The skull base flow   voids are present.    The imaged portions of the paranasal sinuses are clear. The mastoid air   cells are clear. The visualized osseous structures, soft tissues and   partially visualized parotid glands appear normal.    IMPRESSION:    No acute findings in the brain or orbits. No infarcts. No abnormal mural   thickening or enhancement of the superficial temporal arteries.    Small subcortical focus of white matter FLAIR hyperintensity in the left   frontal lobe. Differential diagnosis includes postinfectious/inflammatory   etiology (i.e., Lyme disease), migraine, vasculitis, demyelination, and   microvascular ischemic changes. If clinically indicated, follow-up MRI   may be obtained for further evaluation.    < end of copied text >   NEUROLOGY FOLLOW-UP CONSULT NOTE    RFC: transient right eye visual loss    Interval history: No acute neurologic events overnight. Patient reports feeling well today, denies headache, visual loss, blurry vision, or any weakness/ sensory changes.    Meds:  MEDICATIONS  (STANDING):  pantoprazole    Tablet 40 milliGRAM(s) Oral before breakfast  predniSONE   Tablet 60 milliGRAM(s) Oral daily    MEDICATIONS  (PRN):  melatonin 3 milliGRAM(s) Oral at bedtime PRN Insomnia      PMHx/PSHx/FHx/SHx:  Visual disturbance    MVP (mitral valve prolapse)    Right knee pain, unspecified chronicity    Visual changes    S/P colonoscopy with polypectomy    S/P arthroscopy of left knee    S/P arthroscopy of right knee    History of knee surgery    S/P arthroscopy of shoulder    Fracture of right ankle    VISION LOSS RIGHT EYE      Allergies:  dust (Sneezing; Rhinorrhea)  Grass; Mold (Sneezing; Rhinorrhea)  No Known Drug Allergies      ROS: All systems negative except as documented in Interval history    O:  T(C): 37.1 (05-23-23 @ 11:32), Max: 37.1 (05-23-23 @ 11:32)  HR: 64 (05-23-23 @ 08:17) (61 - 69)  BP: 136/87 (05-23-23 @ 11:32) (113/65 - 143/88)  RR: 18 (05-23-23 @ 11:32) (18 - 19)  SpO2: 99% (05-23-23 @ 11:32) (98% - 99%)    Focused neurologic exam:  MS - AAO x3, speech fluent, rep/naming intact, follows commands, attn/conc/recent and remote memory/fund of knowledge WNL  CN - PERRLA, EOMI, VFF, face sens/str/hearing WNL b/l, tongue/palate midline, trap 5/5 b/l  Motor - Normal bulk/tone, 5/5 all  Sens - LT/temp/vib intact all  DTR's - 2+ all and downgoing b/l plantar response  Coord - FtN intact b/l  Gait and station - Normal casual gait. Able to heel, toe, tandem. Romberg (-)    Pertinent labs/studies:    LABS:  cret        Radiology:  < from: CT Head No Cont and CTA head and neck (05.20.23 @ 18:28) >    FINDINGS:    CTA [place]:  ANTERIOR CIRCULATION  ICA  CAVERNOUS, SUPRACLINOID, BIFURCATION SEGMENTS: Patent without flow   limiting stenosis.    ANTERIOR CEREBRAL ARTERIES: Bilateral A1, anterior communicating and A2   anterior cerebral arteries are unremarkable in course and caliber without   flow limiting stenosis.    MIDDLE CEREBRAL ARTERIES: Patent bilateral M1, M2, and distal MCA   branches without flow limiting stenosis.    Bilateral ophthalmic arteries patent.    POSTERIOR CIRCULATION:  VERTEBRAL ARTERIES: Patent without flow limiting stenosis.  BASILAR ARTERY: Patent no flow limiting stenosis.  POSTERIOR CEREBRAL ARTERIES: Patent without flow limiting stenosis.      CTA NECK:  GREAT VESSELS: Visualized segments are patent, no flow limiting stenosis.    COMMON CAROTID ARTERIES:  RIGHT CCA: Patent without flow limiting stenosis  LEFT CCA: Patent without flow limiting stenosis    CAROTID BULBS:  RIGHT CB: Patentwithout flow limiting stenosis  LEFT CB: Patent without flow limiting stenosis    INTERNAL CAROTID ARTERIES:  RIGHT ICA: Patent no evidence for any hemodynamically significant   stenosis at the ICA origin by NASCET criteria.  LEFT ICA: Patent no evidence for any hemodynamically significant stenosis   at the ICA origin by NASCET criteria.    VERTEBRAL ARTERIES:  RIGHT VA: Patent no evidence for any flow limiting stenosis.  LEFT VA: Patent no evidence for any flow limiting stenosis. Originates   directly from the aortic arch.      SOFT TISSUES: Unremarkable  BONES: Unremarkable    IMPRESSIONS:    HEAD CT: Mild volume loss, microvascular disease, no acute hemorrhage or   midline shift.    CTA COW:  Patent intracranial circulation without flow limiting stenosis.    CTA NECK: Patent, ECAs, ICAs, no  hemodynamically significant stenosis at    ICA origins by NASCET criteria.  Bilateral vertebral arteries are patent without flow limiting stenosis.    < end of copied text >  < from: MR Head and orbits w/wo IV Cont (05.22.23 @ 22:24) >  FINDINGS:    MRI ORBITS:    Globes: Normal. The lenses are normally located.  Retrobulbar fat: Normal. No retrobulbar mass.  Extraocular muscles: Normal in size.  Optic nerves, chiasm and tracts: Normal caliber and signal. No abnormal   enhancement.    MRI BRAIN:    Fat-saturated postcontrast images demonstrate no abnormal enhancement or   mural thickening of the bilateral superficial temporal vessels.    No acute infarction, intracranial hemorrhage or mass lesion.    Small subcortical focus of white matter FLAIR hyperintensity in the left   frontal lobe. Differential diagnosis includes postinfectious/inflammatory   etiology (i.e., Lyme disease), migraine, vasculitis, demyelination, and   microvascular ischemic changes. If clinically indicated, follow-up MRI   may be obtained for further evaluation.    No hydrocephalus. No extra-axial fluid collections. The skull base flow   voids are present.    The imaged portions of the paranasal sinuses are clear. The mastoid air   cells are clear. The visualized osseous structures, soft tissues and   partially visualized parotid glands appear normal.    IMPRESSION:    No acute findings in the brain or orbits. No infarcts. No abnormal mural   thickening or enhancement of the superficial temporal arteries.    Small subcortical focus of white matter FLAIR hyperintensity in the left   frontal lobe. Differential diagnosis includes postinfectious/inflammatory   etiology (i.e., Lyme disease), migraine, vasculitis, demyelination, and   microvascular ischemic changes. If clinically indicated, follow-up MRI   may be obtained for further evaluation.    < end of copied text >    < from: TTE W or WO Ultrasound Enhancing Agent (05.23.23 @ 10:31) >  FINDINGS:     Left Ventricle:  Normal left ventricular cavity size. The left ventricular systolic function is normal with a calculated ejection fraction of 67 % by the Myers's biplane method of disks. There areno regional wall motion abnormalities seen.     Right Ventricle:  Normal right ventricular cavity size and normal systolic function. Tricuspid annular plane systolic excursion (TAPSE) is 2.2 cm (normal >=1.7 cm).     Left Atrium:  The left atrium is normal.     Right Atrium:  The right atrium is normal with an indexed area of 9.63 cm²/m².     Aortic Valve:  There is mild calcification of the aortic valve leaflets. There is trace aortic regurgitation.     Mitral Valve:  There is mitral valve thickening of the anterior and posterior leaflets. There is trace mitral regurgitation.     Tricuspid Valve:  Structurally normal tricuspid valve with normal leaflet excursion.     Pulmonic Valve:  Structurally normal pulmonic valve with normal leaflet excursion.     Pericardium:  No pericardial effusion seen.     Systemic Veins:  The inferior vena cava is normal measuring 1.10 cm in diameter, (normal <2.1cm) with normal inspiratory collapse (normal >50%) consistent with normal right atrial pressure (~3, range 0-5mmHg).  ___________________________________________________________    < end of copied text >

## 2023-05-23 NOTE — DISCHARGE NOTE NURSING/CASE MANAGEMENT/SOCIAL WORK - PATIENT PORTAL LINK FT
You can access the FollowMyHealth Patient Portal offered by [facility] by registering at the following website: [email or web address]. By joining [employer]’s FollowMyHealth portal, you will also be able to view your health information using other applications (apps) compatible with our system.

## 2023-05-23 NOTE — PROGRESS NOTE ADULT - ASSESSMENT
57yM with no PMH developed severe b/l headache and scalp tenderness (R >L) 1.5 weeks ago, underwent outpt GCA evaluation, started on prednisone 60mg qD on 5/16 by PCP, presented to ED with transient R. eye vision loss. Rheumatology consulted for GCA management     # GCA - strong suspicion (age, new headache, scalp tenderness, elevated ESR and CRP, score 7/10)   S/p Solumedrol 250mg x1 on 5/20   labs from PCP:  ESR 53, CRP 87, repeat ESR here 55 and CRP 18    had temporal artery biopsy 5/17 with Dr. Ernandez, result pending   headache and scalp tenderness resolved with prednisone 60mg   no jaw claudication, no PMR symptoms     # transient R. vision loss - unclear if it's related to GCA,    had 2 prior episodes, most recent episode >1 year ago   opthalmology note reviewed- exam: sharp disc   given pt was already on high dose prednisone since 5/16, and normal fundoscopy exam, it is less likely to have ocular symptom while on treatment for GCA   agree with neuro workup     Plan:   - CT head and angio neck negative, MRI pending  - pt received Solumedrol 250mg x1 on 5/20, would give 2 additional Solumedrol 250mg today 5/21 and tomorrow 5/22, then transition to prednisone 60mg starting 5/23   - f/u outpt bx result

## 2023-05-23 NOTE — DISCHARGE NOTE PROVIDER - CARE PROVIDER_API CALL
Brian Rojas)  Internal Medicine; Nephrology  [address] [place] [address], [address]  [place] [address]  Phone: [phone]  Fax: [phone]  Follow Up Time: 2 weeks    neurology,   Call for appointment  Phone: [phone]  Fax: (   )    -  Follow Up Time:     Opthalmology,   [address] [place] [address]  [address]  Phone: [phone]  Fax: (   )    -  Follow Up Time:     Rheumatology,   Phone: [phone]  Fax: (   )    -  Follow Up Time:    Brian Rojas)  Internal Medicine; Nephrology  [address] [facility], [address]  [place] [address]  Phone: [phone]  Fax: [phone]  Follow Up Time: 2 weeks    neurology,   Call for appointment  Phone: [phone]  Fax: (   )    -  Follow Up Time:     Opthalmology,   [address] [place] [address]  [address]  Phone: [phone]  Fax: (   )    -  Follow Up Time:     Sommer Valencia)  Rheumatology  [address] [facility], [address]  [place] [address]  Phone: [phone]  Fax: [phone]  Follow Up Time: 2 weeks

## 2023-05-23 NOTE — CONSULT NOTE ADULT - ASSESSMENT
57 year old denies HIV; admitted for HA and visual loss . Being worked up for TA despite negative biopsy  Has improved dramatically with high doses of steroids  MRI reviewed  with subtle abnormalities  Denies any other symptoms aside from the HA and visual issues which have resolved    no active infection  pt understands need for PCP prophylaxis if he remains on high dose steroids

## 2023-05-23 NOTE — PROGRESS NOTE ADULT - SUBJECTIVE AND OBJECTIVE BOX
DATE OF SERVICE: 05-23-23 @ 13:12  CHIEF COMPLAINT:Patient is a 57y old  Male who presents with a chief complaint of   	        PAST MEDICAL & SURGICAL HISTORY:  MVP (mitral valve prolapse)  "slight"      Right knee pain, unspecified chronicity      Facial palsy      S/P colonoscopy with polypectomy      S/P arthroscopy of left knee  2013      S/P arthroscopy of right knee  2002      History of knee surgery  Open left knee 1985      S/P arthroscopy of shoulder  right 2000      Fracture of right ankle  s/p repair 1997              REVIEW OF SYSTEMS:  CONSTITUTIONAL: No fever, weight loss, or fatigue  EYES: No eye pain, visual disturbances, or discharge  NECK: No pain or stiffness  RESPIRATORY: No cough, wheezing, chills or hemoptysis; No Shortness of Breath  CARDIOVASCULAR: No chest pain, palpitations, passing out, dizziness, or leg swelling  GASTROINTESTINAL: No abdominal or epigastric pain. No nausea, vomiting, or hematemesis; No diarrhea or constipation. No melena or hematochezia.  GENITOURINARY: No dysuria, frequency, hematuria, or incontinence  NEUROLOGICAL: No headaches, memory loss, loss of strength, numbness, or tremors    MUSCULOSKELETAL: No joint pain or swelling; No muscle, back, or extremity pain    Medications:  MEDICATIONS  (STANDING):  pantoprazole    Tablet 40 milliGRAM(s) Oral before breakfast  predniSONE   Tablet 60 milliGRAM(s) Oral daily    MEDICATIONS  (PRN):  melatonin 3 milliGRAM(s) Oral at bedtime PRN Insomnia    	    PHYSICAL EXAM:  T(C): 37.1 (05-23-23 @ 11:32), Max: 37.1 (05-23-23 @ 11:32)  HR: 64 (05-23-23 @ 08:17) (61 - 69)  BP: 136/87 (05-23-23 @ 11:32) (113/65 - 143/88)  RR: 18 (05-23-23 @ 11:32) (18 - 19)  SpO2: 99% (05-23-23 @ 11:32) (98% - 99%)  Wt(kg): --  I&O's Summary    22 May 2023 07:01  -  23 May 2023 07:00  --------------------------------------------------------  IN: 480 mL / OUT: 0 mL / NET: 480 mL    23 May 2023 07:01  -  23 May 2023 13:13  --------------------------------------------------------  IN: 240 mL / OUT: 0 mL / NET: 240 mL        Appearance: Normal	  HEENT:   Normal oral mucosa, PERRL, EOMI	  Lymphatic: No lymphadenopathy  Cardiovascular: Normal S1 S2, No JVD, No murmurs, No edema  Respiratory: Lungs clear to auscultation	  Psychiatry: A & O x 3, Mood & affect appropriate  Gastrointestinal:  Soft, Non-tender, + BS	  Skin: No rashes, No ecchymoses, No cyanosis	  Neurologic: Non-focal  Extremities: Normal range of motion, No clubbing, cyanosis or edema  Vascular: Peripheral pulses palpable 2+ bilaterally    TELEMETRY: 	    ECG:  	  RADIOLOGY:  OTHER: 	  	  LABS:	 	    CARDIAC MARKERS:                  proBNP:   Lipid Profile:   HgA1c:   TSH:

## 2023-05-23 NOTE — DISCHARGE NOTE PROVIDER - PROVIDER TOKENS
PROVIDER:[TOKEN:[[id number]],FOLLOWUP:[2 weeks]],FREE:[LAST:[neurology],PHONE:[phone]],FAX:[(   )    -],ADDRESS:[Call for appointment]],FREE:[LAST:[Opthalmology],PHONE:[phone]],FAX:[(   )    -],ADDRESS:[[address] [place] [address] [address]]],FREE:[LAST:[Rheumatology],PHONE:[phone]],FAX:[(   )    -]] PROVIDER:[TOKEN:[[id number]],FOLLOWUP:[2 weeks]],FREE:[LAST:[neurology],PHONE:[phone]],FAX:[(   )    -],ADDRESS:[Call for appointment]],FREE:[LAST:[Opthalmology],PHONE:[phone]],FAX:[(   )    -],ADDRESS:[[address] [place] [address]]],PROVIDER:[TOKEN:[[id number]],FOLLOWUP:[2 weeks]]

## 2023-05-23 NOTE — DISCHARGE NOTE PROVIDER - NSDCCPCAREPLAN_GEN_ALL_CORE_FT
PRINCIPAL DISCHARGE DIAGNOSIS  Diagnosis: Visual changes  Assessment and Plan of Treatment: follow up with opthalmology outpatient  # Transient Visual Loss OD iso possible GCA vs embolic etiology  - Visual acuity intact, no signs of optic nerve dysfunction  - Pt with transient "shade/curtain-like" visual loss OD self-resolved. Reporting similar episodes over the past 1-2 years   - Currently undergoing treatment/workup for GCA; started on pred 60 qD on 5/16 and s/p temporal artery biopsy on 5/17 with Dr. Ernandez (results pending)  - Outpatient labs showing elevated inflammatory markers (ESR 53, CRP 86, normal WBC and plt count)  - Given elevated inflammatory markers, characteristic symptoms of HA/scalp tenderness/transient episode of visual loss, overall strong suspicion for possible GCA

## 2023-05-23 NOTE — PROGRESS NOTE ADULT - ASSESSMENT
Assessment: Patient ROSALEE MUELLER is a 56 yo M with no significant POHx, currently undergoing work-up for GCA, s/p temporal artery biopsy 5/17 with Dr. Ernandez presenting to the ED for visual loss in the right eye. Pt states that around 10 AM today, he developed a "shade/curtain" over his vision starting from his inferior visual field and extending upwards, excluding the most superior portion of his vision. Adds that episode last around 40 seconds. Reports that he has had previous similar episodes over the past 1-2 years (2-4 episodes) alternating between both eyes. States that 1.5 weeks ago, started developing severe bilateral headache, scalp tenderness and periorbital "pressure" around the right eye. Went to PCP and was found to have elevated inflammatory markers. Was started on 60mg prednisone for empiric treatment of GCA on 5/16 with resolution of symptoms. Subsequently had temporal artery biopsy performed on 5/17 with results pending. No personal or family hx of rheumatological diseases.   Neurology consulted for evaluation of transient right eye vision loss. Patient reports around 10am, he was staring at screen, he developed a "shade going up" and he could only see the top of his vision field and the bottom part of his vision field is completely dark. This episode lasted 30-40 seconds followed up full recover of vision. He had similar episodes twice before but cannot recall the last episode. Patient denies headache, diplopia, numbness, slurring of speech, facial droop or any weakness of extremities during the episode and in general. He denies jaw claudication or temporal tenderness, fever or chills.  For the past 1.5 weeks, patient had bilateral frontal headache with right orbital pressure and was evaluated by pcp. Pt has elevated outpatient ESR/CRP and is s/p temporal artery biopsy 5/17 with Dr. Ernandez. He recently also had MRI IAC for evaluation of b/l presbycuses and MRI IAC normal.  NIHSS 0  opthalmology evaluation reveals normal eye exam.    CT head 5/20: Mild volume loss, microvascular disease, no acute hemorrhage or midline shift.  CTA head and neck 5/20: Patent intracranial circulation without flow limiting stenosis. Patent, ECAs, ICAs, no  hemodynamically significant stenosis.  Bilateral vertebral arteries are patent without flow limiting stenosis.  MRI Brain and orbits w/w/o 5/22: No acute findings in the brain or orbits. No infarcts. No abnormal mural thickening or enhancement of the superficial temporal arteries. Small subcortical white matter focus likely chronic microvascular ischemic changes    Impression:  Resolved transient Right eye lower visual field loss w/o neurological deficit. Unlikely acute cerebrovascular or acute intracranial etiology given no acute infarcts or any acute intracranial etiology on MRI brain/orbits and patent intracranial circulation and Patent ECAs & ICAs.  Small subcortical white matter focus likely chronic microvascular ischemic changes.      Recommendations:  []  CTH, CTA head and neck result above  [] MRI Brain and orbits w/w/o result above  [] opthalmology evaluation reveals normal eye exam, follow up outpatient  [] follow up with rheumatology team, who will follow up on biopsy report  and recommended to switch to prednisone 60mg qD on 5/23  []  labs: ESR (55), CRP (18), hgA1C, Lipid panel, TSH (0.45), T3,T4, Vitamin B1, B6, B12 (1188), folate (>20), D 25-hydroxy  [] Patient can follow up with general neurology at [address] [place] [address]  after discharge by calling [phone] to schedule this appointment  [] No further inpatient neurology recommendation, will sign off, please call consult service [phone] with any questions.      Plans discussed with neurology attending, Dr. Romero. Assessment: Patient ROSALEE MUELLER is a 58 yo M with no significant POHx, currently undergoing work-up for GCA, s/p temporal artery biopsy 5/17 with Dr. Ernandez presenting to the ED for visual loss in the right eye. Pt states that around 10 AM today, he developed a "shade/curtain" over his vision starting from his inferior visual field and extending upwards, excluding the most superior portion of his vision. Adds that episode last around 40 seconds. Reports that he has had previous similar episodes over the past 1-2 years (2-4 episodes) alternating between both eyes. States that 1.5 weeks ago, started developing severe bilateral headache, scalp tenderness and periorbital "pressure" around the right eye. Went to PCP and was found to have elevated inflammatory markers. Was started on 60mg prednisone for empiric treatment of GCA on 5/16 with resolution of symptoms. Subsequently had temporal artery biopsy performed on 5/17 with results pending. No personal or family hx of rheumatological diseases.   Neurology consulted for evaluation of transient right eye vision loss. Patient reports around 10am, he was staring at screen, he developed a "shade going up" and he could only see the top of his vision field and the bottom part of his vision field is completely dark. This episode lasted 30-40 seconds followed up full recover of vision. He had similar episodes twice before but cannot recall the last episode. Patient denies headache, diplopia, numbness, slurring of speech, facial droop or any weakness of extremities during the episode and in general. He denies jaw claudication or temporal tenderness, fever or chills.  For the past 1.5 weeks, patient had bilateral frontal headache with right orbital pressure and was evaluated by pcp. Pt has elevated outpatient ESR/CRP and is s/p temporal artery biopsy 5/17 with Dr. Ernandez. He recently also had MRI IAC for evaluation of b/l presbycuses and MRI IAC normal.  NIHSS 0  opthalmology evaluation reveals normal eye exam.    CT head 5/20: Mild volume loss, microvascular disease, no acute hemorrhage or midline shift.  CTA head and neck 5/20: Patent intracranial circulation without flow limiting stenosis. Patent, ECAs, ICAs, no  hemodynamically significant stenosis.  Bilateral vertebral arteries are patent without flow limiting stenosis.  MRI Brain and orbits w/w/o 5/22: No acute findings in the brain or orbits. No infarcts. No abnormal mural thickening or enhancement of the superficial temporal arteries. Small subcortical white matter focus likely chronic microvascular ischemic changes    Impression:  Resolved transient Right eye lower visual field loss w/o neurological deficit. Unlikely acute cerebrovascular or acute intracranial etiology given no acute infarcts or any acute intracranial etiology on MRI brain/orbits and patent intracranial circulation and Patent ECAs & ICAs.  Small subcortical white matter focus likely chronic microvascular ischemic changes.      Recommendations:  []  CTH, CTA head and neck result above  [] MRI Brain and orbits w/w/o result above  [] TTE 5/23: EF 67%, follow up with primary team  [] opthalmology evaluation reveals normal eye exam, follow up outpatient  [] follow up with rheumatology team, who will follow up on biopsy report  and recommended to switch to prednisone 60mg qD on 5/23  []  labs: ESR (55), CRP (18), hgA1C, Lipid panel, TSH (0.45), T3,T4, Vitamin B1, B6, B12 (1188), folate (>20), D 25-hydroxy  [] Patient can follow up with general neurology at [address] [place] [address]  after discharge by calling [phone] to schedule this appointment  [] No further inpatient neurology recommendation, will sign off, please call consult service [phone] with any questions.      Plans discussed with neurology attending, Dr. Romero.

## 2023-05-23 NOTE — DISCHARGE NOTE PROVIDER - NSDCMRMEDTOKEN_GEN_ALL_CORE_FT
Bacid oral capsule: 1 cap(s) orally once a day  cyanocobalamin 1000 mcg oral tablet: 1 tab(s) orally once a day  pantoprazole 40 mg oral delayed release tablet: 1 tab(s) orally once a day (before a meal)  predniSONE 10 mg oral tablet: 2 tab(s) orally 3 times a day  Therapeutic Multiple Vitamins oral tablet: 1 tab(s) orally once a day  Tylenol 325 mg oral tablet: 2 tab(s) orally 3 times a day   Bacid oral capsule: 1 cap(s) orally once a day  cyanocobalamin 1000 mcg oral tablet: 1 tab(s) orally once a day  pantoprazole 40 mg oral delayed release tablet: 1 tab(s) orally once a day (before a meal)  predniSONE 20 mg oral tablet: 3 tab(s) orally once a day  Therapeutic Multiple Vitamins oral tablet: 1 tab(s) orally once a day  Tylenol 325 mg oral tablet: 2 tab(s) orally 3 times a day   Bacid oral capsule: 1 cap(s) orally once a day  cyanocobalamin 1000 mcg oral tablet: 1 tab(s) orally once a day  pantoprazole 40 mg oral delayed release tablet: 1 tab(s) orally once a day (before a meal)  predniSONE 20 mg oral tablet: 3 tab(s) orally once a day  sulfamethoxazole-trimethoprim 800 mg-160 mg oral tablet: 1 tab(s) orally 3 times a week  Therapeutic Multiple Vitamins oral tablet: 1 tab(s) orally once a day  Tylenol 325 mg oral tablet: 2 tab(s) orally 3 times a day

## 2023-05-23 NOTE — DISCHARGE NOTE PROVIDER - CARE PROVIDERS DIRECT ADDRESSES
,[email or web address],DirectAddress_Unknown,DirectAddress_Unknown,[email or web address] [email or web address],DirectAddress_Unknown,DirectAddress_[email or web address]

## 2023-05-23 NOTE — CONSULT NOTE ADULT - SUBJECTIVE AND OBJECTIVE BOX
Patient is a 57y old  Male who presents with a chief complaint of   HPI:    -    : Patient is a 56 yo M with no significant POHx, currently undergoing work-up for GCA, s/p temporal artery biopsy 5/17 with Dr. Ernandez presenting to the ED for visual loss in the right eye.   Pt states that around 10 AM today, he developed a "shade/curtain" over his vision starting from his inferior visual field and extending upwards, excluding the most superior portion of his vision.    episode last around 40 seconds.   Reports that he has had previous similar episodes over the past 1-2 years (2-4 episodes) alternating between both eyes.   States that 1.5 weeks ago, started developing severe bilateral headache, scalp tenderness and periorbital "pressure" around the right eye.   Went to PCP and was found to have elevated inflammatory markers.   Was started on  prednisone for empiric treatment of GCA on 5/16 with resolution of symptoms.    (20 May 2023 19:36)      PAST MEDICAL & SURGICAL HISTORY:  MVP (mitral valve prolapse)  "slight"      Right knee pain, unspecified chronicity      Facial palsy      S/P colonoscopy with polypectomy      S/P arthroscopy of left knee  2013      S/P arthroscopy of right knee  2002      History of knee surgery  Open left knee 1985      S/P arthroscopy of shoulder  right 2000      Fracture of right ankle  s/p repair 1997          Social history:    FAMILY HISTORY:    REVIEW OF SYSTEMS  General:	Denies any malaise fatigue or chills. Fevers absent    Skin:No rash  	  Ophthalmologic:Denies any visual complaints,discharge redness or photophobia  	  ENMT:No nasal discharge,headache,sinus congestion or throat pain.No dental complaints    Respiratory and Thorax:No cough,sputum or chest pain.Denies shortness of breath  	  Cardiovascular:	No chest pain,palpitaions or dizziness    Gastrointestinal:	NO nausea,abdominal pain or diarrhea.    Genitourinary:	No dysuria,frequency. No flank pain    Musculoskeletal:	No joint swelling or pain.No weakness    Neurological:No confusion,diziness.No extremity weakness.No bladder or bowel incontinence	    Psychiatric:No delusions or hallucinations	    Hematology/Lymphatics:	No LN swelling.No gum bleeding     Endocrine:	No recent weight gain or loss.No abnormal heat/cold intolerance    Allergic/Immunologic:	No hives or rash   Allergies    dust (Sneezing; Rhinorrhea)  Grass; Mold (Sneezing; Rhinorrhea)  No Known Drug Allergies    Intolerances        Antimicrobials:          Vital Signs Last 24 Hrs  T(C): 37.1 (23 May 2023 11:32), Max: 37.1 (23 May 2023 11:32)  T(F): 98.7 (23 May 2023 11:32), Max: 98.7 (23 May 2023 11:32)  HR: 64 (23 May 2023 08:17) (61 - 69)  BP: 136/87 (23 May 2023 11:32) (113/65 - 143/88)  BP(mean): --  RR: 18 (23 May 2023 11:32) (18 - 19)  SpO2: 99% (23 May 2023 11:32) (98% - 99%)    Parameters below as of 23 May 2023 11:32  Patient On (Oxygen Delivery Method): room air        PHYSICAL EXAM:Pleasant patient in no acute distress.       No cachexia     Eyes:PERRL EOMI.NO discharge or conjunctival injection    ENMT:No sinus tenderness.No thrush.No pharyngeal exudate or erythema.Fair dental hygiene    Neck:Supple,No LN,no JVD      Respiratory:Good air entry bilaterally,CTA    Cardiovascular:S1 S2 wnl, No murmurs,rub or gallops    Gastrointestinal:Soft BS(+) no tenderness no masses ,No rebound or guarding    Genitourinary:No CVA tendereness     Rectal:    Extremities:No cyanosis,clubbing or edema.    Vascular:peripheral pulses felt    Neurological:AAO X 3,No grossly focal deficits                         RECENT CULTURES:      MICROBIOLOGY:          Radiology:      Assessment:        Recommendations and Plan:    Pager [phone]  After 5 pm/weekends or if no response :[phone]

## 2023-05-23 NOTE — PROGRESS NOTE ADULT - SUBJECTIVE AND OBJECTIVE BOX
***note is incomplete***    Chandu Hutchinson MD, PGY-4  Rheumatology Fellow  Reachable on TEAMS    ROSALEE MUELLER  [id number]    INTERVAL EVENTS          Review of Systems:  Gen:  No fevers/chills, weight loss  HEENT: as per HPI  CVS: No chest pain/palpitations  Resp: No SOB/wheezing  GI: No N/V/C/D/abdominal pain  MSK: no joint complains   Skin: No new rashes  Neuro: as per HPI, no headache now    MEDICATIONS  (STANDING):  pantoprazole    Tablet 40 milliGRAM(s) Oral before breakfast  predniSONE   Tablet 60 milliGRAM(s) Oral daily    MEDICATIONS  (PRN):  melatonin 3 milliGRAM(s) Oral at bedtime PRN Insomnia      Allergies    dust (Sneezing; Rhinorrhea)  Grass; Mold (Sneezing; Rhinorrhea)  No Known Drug Allergies    Intolerances      Vital Signs Last 24 Hrs  T(C): 37.1 (23 May 2023 11:32), Max: 37.1 (23 May 2023 11:32)  T(F): 98.7 (23 May 2023 11:32), Max: 98.7 (23 May 2023 11:32)  HR: 64 (23 May 2023 08:17) (61 - 69)  BP: 136/87 (23 May 2023 11:32) (113/65 - 143/88)  BP(mean): --  RR: 18 (23 May 2023 11:32) (18 - 19)  SpO2: 99% (23 May 2023 11:32) (98% - 99%)    Parameters below as of 23 May 2023 11:32  Patient On (Oxygen Delivery Method): room air      Physical Exam:  General: NAD   HEENT: EOMI, + s/p R side temporal artery bx, + pulse on the L. side , no scalp tenderness   CVS: +S1/S2, RRR, no murmurs/rubs/gallops  Resp: CTA b/l. No crackles/wheezing  GI: Soft, NT/ ND +BS  MSK: no synovitis, joints NTP    Neuro: AAOx3  Skin: no visible rashes      LABS:  cret          RADIOLOGY & ADDITIONAL STUDIES:  < from: CT Head No Cont (05.20.23 @ 18:28) >    IMPRESSIONS:    HEAD CT: Mild volume loss, microvascular disease, no acute hemorrhage or   midline shift.    CTA COW:  Patent intracranial circulation without flow limiting stenosis.    CTA NECK: Patent, ECAs, ICAs, no  hemodynamically significant stenosis at    ICA origins by NASCET criteria.  Bilateral vertebral arteries are patent without flow limiting stenosis.      < end of copied text >      < from: MR Orbits w/wo IV Cont (05.22.23 @ 22:24) >  ACC: [id number] EXAM:  MR ORBITS ONLY WAWIC   ORDERED BY: MARTIN BELTRAN     ACC: [id number] EXAM:  MR BRAIN WAW IC   ORDERED BY: MICHELLE NGUYEN     PROCEDURE DATE:  05/22/2023          INTERPRETATION:  CLINICAL INDICATION: Temporal arteritis, transient   vision loss.    TECHNIQUE: Multiplanar multisequence MR images of the brain and orbits   were obtained before and after the intravenous administration of   contrast.  10 cc administered gadavist intravenous contrast.    COMPARISON: No prior MRI available for direct comparison.    FINDINGS:    MRI ORBITS:    Globes: Normal. The lenses are normally located.  Retrobulbar fat: Normal. No retrobulbar mass.  Extraocular muscles: Normal in size.  Optic nerves, chiasm and tracts: Normal caliber and signal. No abnormal   enhancement.    MRI BRAIN:    Fat-saturated postcontrast images demonstrate no abnormal enhancement or   mural thickening of the bilateral superficial temporal vessels.    No acute infarction, intracranial hemorrhage or mass lesion.    Small subcortical focus of white matter FLAIR hyperintensity in the left   frontal lobe. Differential diagnosis includes postinfectious/inflammatory   etiology (i.e., Lyme disease), migraine, vasculitis, demyelination, and   microvascular ischemic changes. If clinically indicated, follow-up MRI   may be obtained for further evaluation.    No hydrocephalus. No extra-axial fluid collections. The skull base flow   voids are present.    The imaged portions of the paranasal sinuses are clear. The mastoid air   cells are clear. The visualized osseous structures, soft tissues and   partially visualized parotid glands appear normal.    IMPRESSION:    No acute findings in the brain or orbits. No infarcts. No abnormal mural   thickening or enhancement of the superficial temporal arteries.    Small subcortical focus of white matter FLAIR hyperintensity in the left   frontal lobe. Differential diagnosis includes postinfectious/inflammatory   etiology (i.e., Lyme disease), migraine, vasculitis, demyelination, and   microvascular ischemic changes. If clinically indicated, follow-up MRI   may be obtained for further evaluation.    < end of copied text >    < from: TTE W or WO Ultrasound Enhancing Agent (05.23.23 @ 10:31) >  _______________________________________________________________________________________  CONCLUSIONS:      1. Normal left ventricular cavity size. The left ventricular systolic function is normal.   2. Normal right ventricular cavity size and normal systolic function.   3. No prior echocardiogram is available for comparison.    ________________________________________________________________________________________    < end of copied text >

## 2023-05-23 NOTE — DISCHARGE NOTE PROVIDER - HOSPITAL COURSE
Patient ROSALEE MUELLER is a 58 yo M with no significant POHx, currently undergoing work-up for GCA, s/p temporal artery biopsy 5/17 with Dr. Ernandez presenting to the ED for visual loss in the right eye. Pt states that around 10 AM today, he developed a "shade/curtain" over his vision starting from his inferior visual field and extending upwards, excluding the most superior portion of his vision. Adds that episode last around 40 seconds. Reports that he has had previous similar episodes over the past 1-2 years (2-4 episodes) alternating between both eyes. States that 1.5 weeks ago, started developing severe bilateral headache, scalp tenderness and periorbital "pressure" around the right eye. Went to PCP and was found to have elevated inflammatory markers. Was started on 60mg prednisone for empiric treatment of GCA on 5/16 with resolution of symptoms. Subsequently had temporal artery biopsy performed on 5/17 with results pending. No personal or family hx of rheumatological diseases.   Neurology consulted for evaluation of transient right eye vision loss. Patient reports around 10am, he was staring at screen, he developed a "shade going up" and he could only see the top of his vision field and the bottom part of his vision field is completely dark. This episode lasted 30-40 seconds followed up full recover of vision. He had similar episodes twice before but cannot recall the last episode. Patient denies headache, diplopia, numbness, slurring of speech, facial droop or any weakness of extremities during the episode and in general. He denies jaw claudication or temporal tenderness, fever or chills.  For the past 1.5 weeks, patient had bilateral frontal headache with right orbital pressure and was evaluated by pcp. Pt has elevated outpatient ESR/CRP and is s/p temporal artery biopsy 5/17 with Dr. Ernandez. He recently also had MRI IAC for evaluation of b/l presbycuses and MRI IAC normal.  NIHSS 0  opthalmology evaluation reveals normal eye exam.    CT head 5/20: Mild volume loss, microvascular disease, no acute hemorrhage or midline shift.  CTA head and neck 5/20: Patent intracranial circulation without flow limiting stenosis. Patent, ECAs, ICAs, no  hemodynamically significant stenosis.  Bilateral vertebral arteries are patent without flow limiting stenosis.  MRI Brain and orbits w/w/o 5/22: No acute findings in the brain or orbits. No infarcts. No abnormal mural thickening or enhancement of the superficial temporal arteries. Small subcortical white matter focus likely chronic microvascular ischemic changes    Impression:  Resolved transient Right eye lower visual field loss w/o neurological deficit. Unlikely acute cerebrovascular or acute intracranial etiology given no acute infarcts or any acute intracranial etiology on MRI brain/orbits and patent intracranial circulation and Patent ECAs & ICAs.  Small subcortical white matter focus likely chronic microvascular ischemic changes.      Recommendations:  []  CTH, CTA head and neck result above  [] MRI Brain and orbits w/w/o result above  [] TTE 5/23: EF 67%, follow up with primary team  [] opthalmology evaluation reveals normal eye exam, follow up outpatient  [] follow up with rheumatology team, who will follow up on biopsy report  and recommended to switch to prednisone 60mg qD on 5/23

## 2023-05-23 NOTE — CHART NOTE - NSCHARTNOTEFT_GEN_A_CORE
Patient to be discharged. Patient's outpatient Dr. Anel Ernandez notified ophthalmology team of results that state that there is no inflammation. Will call to get Biopsy report in order to scan into the system [facility] [phone] Accession # [id number]. Recommend discharge with 60mg prednisone as well as Bactrim for PCP prophylaxis. Patient to follow up with Dr. Sommer Valencia at the [address] [facility] [address]    recs conveyed to primary team    Chandu Hutchinson MD, PGY-4  Rheumatology Fellow  Reachable on TEAMS

## 2023-05-25 ENCOUNTER — NON-APPOINTMENT (OUTPATIENT)
Age: 58
End: 2023-05-25

## 2023-06-02 ENCOUNTER — APPOINTMENT (OUTPATIENT)
Dept: RHEUMATOLOGY | Facility: CLINIC | Age: 58
End: 2023-06-02
Payer: COMMERCIAL

## 2023-06-02 VITALS
HEART RATE: 66 BPM | DIASTOLIC BLOOD PRESSURE: 93 MMHG | BODY MASS INDEX: 26.64 KG/M2 | TEMPERATURE: 97.1 F | HEIGHT: 73 IN | WEIGHT: 201 LBS | OXYGEN SATURATION: 99 % | SYSTOLIC BLOOD PRESSURE: 151 MMHG

## 2023-06-02 DIAGNOSIS — R93.0 ABNORMAL FINDINGS ON DIAGNOSTIC IMAGING OF SKULL AND HEAD, NOT ELSEWHERE CLASSIFIED: ICD-10-CM

## 2023-06-02 PROCEDURE — 99215 OFFICE O/P EST HI 40 MIN: CPT

## 2023-06-02 NOTE — PHYSICAL EXAM
[General Appearance - Alert] : alert [General Appearance - In No Acute Distress] : in no acute distress [General Appearance - Well Developed] : well developed [Nasal Cavity] : the nasal mucosa and septum were normal [Respiration, Rhythm And Depth] : normal respiratory rhythm and effort [Auscultation Breath Sounds / Voice Sounds] : lungs were clear to auscultation bilaterally [Heart Rate And Rhythm] : heart rate was normal and rhythm regular [Heart Sounds] : normal S1 and S2 [Edema] : there was no peripheral edema [Abdomen Soft] : soft [Involuntary Movements] : no involuntary movements were seen [] : no rash [No Focal Deficits] : no focal deficits [Oriented To Time, Place, And Person] : oriented to person, place, and time [Impaired Insight] : insight and judgment were intact [FreeTextEntry1] : insomnia

## 2023-06-02 NOTE — ASSESSMENT
[FreeTextEntry1] : Suspected GCA , presented with headache and scalp tenderness with resolution of sms on prednisone , but temporal artery biopsy 5/17 - negative for inflammation\par On prednisone 40 mg a day,- severe sms of steroid induced mood side effects- insomnia\par history of recurrent transient R. vision loss- ? etiology \par MR head/orbits showed no acute findings. with small subcortical focus in left frontal lobe\par ECHO - normal\par \par labs\par quick prednisone taper 30 mg x 7 days, 20 mg a day x 7 days , 15 mg a day  x 7 day, 10 mg a day x 7 days\par opthalmology follow-up\par neurology followup\par MR head/orbits fu as outpatient to FU on small subcortical focus in left frontal lobe  \par \par \par RTO 4 weeks or earlier if any sms recurrence as prednisone is tapered\par

## 2023-06-02 NOTE — HISTORY OF PRESENT ILLNESS
[FreeTextEntry1] : 57 ye/o M  was evaluated during hospital admission to [facility] after he developed severe b/l headache and scalp tenderness (R >L) 1.5 weeks ago prior the admission, he had  temporal artery biopsy and was started empirically on prednisone 60 mg qD on 5/16 and received 1/2 pulse steroids over 3 days during the admission , followed by prednisone 60 mg a day, dose was reduced to 40 mg a day 1 week ago.\par He had no jaw claudication, no PMR symptoms \par Reported transient R. vision loss, that felt to be unlikely related to GCA, , had similar episode 2 years ago.\par \par temporal artery biopsy 5/17 -  negative for inflammation\par opthalmology exam: sharp disc \par MR head/orbits showed no acute findings.  with  small subcortical focus in left frontal lobe  - follow up MRI was recommended as outpatient\par ECHO - normal\par \par At this time main c/o : \par -----------------------------\par feels "wired , on overdrive", not able to sleep\par no headache , no jaw pain/ but has jaw cracking\par \par \par

## 2023-06-02 NOTE — REVIEW OF SYSTEMS
[As noted in HPI] : as noted in HPI [As Noted in HPI] : as noted in HPI [Anxiety] : anxiety [Depression] : depression [Negative] : Heme/Lymph

## 2023-06-06 ENCOUNTER — APPOINTMENT (OUTPATIENT)
Dept: NEUROLOGY | Facility: CLINIC | Age: 58
End: 2023-06-06

## 2023-06-07 ENCOUNTER — APPOINTMENT (OUTPATIENT)
Dept: NEUROLOGY | Facility: CLINIC | Age: 58
End: 2023-06-07
Payer: COMMERCIAL

## 2023-06-07 VITALS
BODY MASS INDEX: 26.64 KG/M2 | DIASTOLIC BLOOD PRESSURE: 95 MMHG | SYSTOLIC BLOOD PRESSURE: 168 MMHG | HEART RATE: 72 BPM | WEIGHT: 201 LBS | HEIGHT: 73 IN

## 2023-06-07 PROCEDURE — 99214 OFFICE O/P EST MOD 30 MIN: CPT

## 2023-06-07 RX ORDER — ASPIRIN 81 MG/1
81 TABLET, CHEWABLE ORAL DAILY
Qty: 90 | Refills: 1 | Status: ACTIVE | COMMUNITY
Start: 2023-06-07 | End: 1900-01-01

## 2023-06-07 NOTE — ASSESSMENT
[FreeTextEntry1] : 57 year old man with an episode of amurosis fugax, recurrent, with headache, and elevated inflammatory markers, now on a taper schedule of steroids.  Normal neurological exam.  No biopsy evidence of GCA.  MRI without significant pathology.  CTA without atherosclerotic or other vasculopathic changes. \par -continue follow up with rheumatology.  Close monitoring as steroids tapering. \par -starting ASA 81mg daily\par -check A1c and lipid profile\par -follow ESR CRP per rheumatology\par -RTC 6 months.  If continues to have events, may need long term cardiac monitoring\par

## 2023-06-07 NOTE — HISTORY OF PRESENT ILLNESS
[FreeTextEntry1] : 57 year old man with recent hospitalization for transient vision loss, headache, and elevated inflammatory markers, presenting to neurology for follow up.  \par \par Patient endorses feeling well overall, but endorses insomnia, and feeling on edge since starting steroids.  He is currently on a taper, at 30mg daily. \par \par He was seen in [facility] after experiencing acute vision loss in the R eye.  He said it appered as though a curtain had come up and obscured his vision altitudinally.  The upper portion of his vision was slightly present during this time.  His symptoms lasted about 30s and resolved spontaneously.  There was no eye pain, no diplopia.  He denied any other changes to mentation, speech/language, swallowing, motor, gait, and sensation were all normal.  No recent medical illness, including fevers, chills, chest pain, shortness of breath.  \par \par In [facility] he had elevated inflammatory enzymes, and was started on pulse steroids.  He was already started several days prior in outpatient setting.  He had a temporal artery biopsy on 5/17 that did not show any evidence of inflammation.  He is following with rheumatology.  \par \par On exam: \par GEN: NAD\par CV: RRR, S1, S2\par PULM: CTAB\par HEENT: no temporal or jaw tenderness\par EXTREM: no CCE\par NEURO: \par aWake, alert, oriented, follows commands, giving a clear history, speaking fluently, normal word choice.  Good insight, and reasoning.  Verbose.  \par PUpls 3-2mm, symmetric, full VF's, normal fundus, full EOM, no nystagmus, no facial weakness, no dysarthria, tongue midline. \par MOTOR: normal bulk and tone.  No drift.  5/5 throughout to confrontation. \par SENSORY: intact symmetrically to light touch\par COORDINATION: normal FNF\par REFLEXES: trace BB, Br, patellar, 1+ achilles. \par \par MRI brain images reviewed: no diffusion restriction.  Small focus of FLAIR hyperintensity in the white matter of the frontal parasaggital cortex. \par CTA H&N images reviewd: No significant cervical carotid or vertebral stenosis. \par \par

## 2023-06-08 ENCOUNTER — NON-APPOINTMENT (OUTPATIENT)
Age: 58
End: 2023-06-08

## 2023-06-08 ENCOUNTER — TRANSCRIPTION ENCOUNTER (OUTPATIENT)
Age: 58
End: 2023-06-08

## 2023-06-14 LAB
ALBUMIN SERPL ELPH-MCNC: 4.2 G/DL
ALP BLD-CCNC: 80 U/L
ALT SERPL-CCNC: 31 U/L
ANION GAP SERPL CALC-SCNC: 11 MMOL/L
AST SERPL-CCNC: 22 U/L
BILIRUB SERPL-MCNC: 0.6 MG/DL
BUN SERPL-MCNC: 19 MG/DL
CALCIUM SERPL-MCNC: 9.4 MG/DL
CHLORIDE SERPL-SCNC: 98 MMOL/L
CO2 SERPL-SCNC: 28 MMOL/L
CREAT SERPL-MCNC: 1.04 MG/DL
CRP SERPL-MCNC: <3 MG/L
EGFR: 84 ML/MIN/1.73M2
ERYTHROCYTE [SEDIMENTATION RATE] IN BLOOD BY WESTERGREN METHOD: 11 MM/HR
ESTIMATED AVERAGE GLUCOSE: 114 MG/DL
GLUCOSE SERPL-MCNC: 111 MG/DL
HBA1C MFR BLD HPLC: 5.6 %
POTASSIUM SERPL-SCNC: 3.9 MMOL/L
PROT SERPL-MCNC: 6.6 G/DL
SODIUM SERPL-SCNC: 137 MMOL/L

## 2023-07-05 ENCOUNTER — APPOINTMENT (OUTPATIENT)
Dept: RHEUMATOLOGY | Facility: CLINIC | Age: 58
End: 2023-07-05
Payer: COMMERCIAL

## 2023-07-05 VITALS
HEART RATE: 83 BPM | DIASTOLIC BLOOD PRESSURE: 84 MMHG | BODY MASS INDEX: 27.57 KG/M2 | SYSTOLIC BLOOD PRESSURE: 147 MMHG | TEMPERATURE: 97.1 F | WEIGHT: 208 LBS | HEIGHT: 73 IN | OXYGEN SATURATION: 98 % | RESPIRATION RATE: 16 BRPM

## 2023-07-05 PROCEDURE — 99214 OFFICE O/P EST MOD 30 MIN: CPT | Mod: GC

## 2023-07-06 NOTE — HISTORY OF PRESENT ILLNESS
[de-identified] : Last was seen on June 2, 2923 [FreeTextEntry1] : as of 7/2023: \par ------------------\par Pt says he feels well. Currently on prednisone 10mg for last 1.5 weeks, from 20mg. Has occasional mild headache, however was nothing like his initial presenting headache. Requesting to taper off steroids as soon as possible.\par \par -Denies scalp tenderness, jaw claudication, shoulder/neck/hip stiffness/pain, visual disturbance.

## 2023-07-06 NOTE — ASSESSMENT
[FreeTextEntry1] : Italian-moderate clinical suspicion for GCA , presented with headache and scalp tenderness w/ elevated inflammatory markers, w/ resolution of sms on prednisone, but temporal artery biopsy 5/17 - negative for inflammation\par Amaurosis Fugas -Received pulse steroids \par On rapid steroid taper: prednisone 60 (7d), 40 (7d), 30 (7d), 20 (7d), currently 10mg for last 1.5 weeks.  \par \par work-up for amaurosis fugax - negative:. \par MR head/orbits showed no acute findings. with small subcortical focus in left frontal lobe\par ECHO - normal\par on  aspirin per neuro  \par \par Plan:\par -currently w/o complaints and requesting to rapidly taper off steroids\par -will check CBC, CMP, ESR, CRP\par -if negative inflammatory markers, will proceed w/ following taper: 7.5 mg (2 weeks), 5 mg (2 weeks), 2.5 mg (until follow up)\par \par RTC 6 weeks or earlier if any sms recurrence w/ steroid taper\par \par Ranjith Ritchie\par Rheumatology Fellow\par d/w Dr Valencia

## 2023-07-06 NOTE — PHYSICAL EXAM
[General Appearance - Alert] : alert [General Appearance - In No Acute Distress] : in no acute distress [General Appearance - Well Developed] : well developed [Sclera] : the sclera and conjunctiva were normal [Outer Ear] : the ears and nose were normal in appearance [Examination Of The Oral Cavity] : the lips and gums were normal [Nasal Cavity] : the nasal mucosa and septum were normal [Oropharynx] : the oropharynx was normal [Neck Appearance] : the appearance of the neck was normal [Respiration, Rhythm And Depth] : normal respiratory rhythm and effort [Auscultation Breath Sounds / Voice Sounds] : lungs were clear to auscultation bilaterally [Heart Rate And Rhythm] : heart rate was normal and rhythm regular [Heart Sounds] : normal S1 and S2 [Edema] : there was no peripheral edema [Abdomen Soft] : soft [Involuntary Movements] : no involuntary movements were seen [Musculoskeletal - Swelling] : no joint swelling seen [] : no rash [No Focal Deficits] : no focal deficits [Oriented To Time, Place, And Person] : oriented to person, place, and time [Impaired Insight] : insight and judgment were intact [FreeTextEntry1] : no scalp tenderness

## 2023-07-07 LAB
ALBUMIN SERPL ELPH-MCNC: 4.7 G/DL
ALP BLD-CCNC: 89 U/L
ALT SERPL-CCNC: 23 U/L
ANION GAP SERPL CALC-SCNC: 13 MMOL/L
AST SERPL-CCNC: 21 U/L
BILIRUB SERPL-MCNC: 0.3 MG/DL
BUN SERPL-MCNC: 17 MG/DL
CALCIUM SERPL-MCNC: 9.7 MG/DL
CHLORIDE SERPL-SCNC: 102 MMOL/L
CO2 SERPL-SCNC: 26 MMOL/L
CREAT SERPL-MCNC: 1.02 MG/DL
CRP SERPL-MCNC: 3 MG/L
EGFR: 85 ML/MIN/1.73M2
ERYTHROCYTE [SEDIMENTATION RATE] IN BLOOD BY WESTERGREN METHOD: 13 MM/HR
GLUCOSE SERPL-MCNC: 102 MG/DL
POTASSIUM SERPL-SCNC: 4.6 MMOL/L
PROT SERPL-MCNC: 7.1 G/DL
SODIUM SERPL-SCNC: 141 MMOL/L

## 2023-07-10 ENCOUNTER — NON-APPOINTMENT (OUTPATIENT)
Age: 58
End: 2023-07-10

## 2023-08-02 ENCOUNTER — RX RENEWAL (OUTPATIENT)
Age: 58
End: 2023-08-02

## 2023-08-15 ENCOUNTER — APPOINTMENT (OUTPATIENT)
Dept: RHEUMATOLOGY | Facility: CLINIC | Age: 58
End: 2023-08-15
Payer: COMMERCIAL

## 2023-08-15 VITALS
BODY MASS INDEX: 28.89 KG/M2 | HEIGHT: 73 IN | OXYGEN SATURATION: 98 % | HEART RATE: 59 BPM | TEMPERATURE: 98.1 F | DIASTOLIC BLOOD PRESSURE: 81 MMHG | SYSTOLIC BLOOD PRESSURE: 150 MMHG | WEIGHT: 218 LBS | RESPIRATION RATE: 16 BRPM

## 2023-08-15 DIAGNOSIS — R21 RASH AND OTHER NONSPECIFIC SKIN ERUPTION: ICD-10-CM

## 2023-08-15 PROCEDURE — 99214 OFFICE O/P EST MOD 30 MIN: CPT

## 2023-08-15 RX ORDER — PREDNISONE 2.5 MG/1
2.5 TABLET ORAL
Qty: 100 | Refills: 0 | Status: ACTIVE | COMMUNITY
Start: 2023-07-05

## 2023-08-16 PROBLEM — R21 RASH: Status: ACTIVE | Noted: 2023-08-15

## 2023-08-16 NOTE — HISTORY OF PRESENT ILLNESS
[de-identified] : Last was seen on July 2023 [FreeTextEntry1] : Itnerval history:  ------------------ Pt says he feels well. Currently on prednisone 2.5mg  a day  AS steroids decreased - no headache, no scalp tenderness, no jaw claudication, no shoulder/neck/hip stiffness/pain, visual disturbance.  in last few weeks noted purpuric lesions on LE with mild scale/? if had previous history of psoriasis

## 2023-08-16 NOTE — PHYSICAL EXAM
[General Appearance - Alert] : alert [General Appearance - In No Acute Distress] : in no acute distress [General Appearance - Well Developed] : well developed [Sclera] : the sclera and conjunctiva were normal [Outer Ear] : the ears and nose were normal in appearance [Examination Of The Oral Cavity] : the lips and gums were normal [Nasal Cavity] : the nasal mucosa and septum were normal [Oropharynx] : the oropharynx was normal [Neck Appearance] : the appearance of the neck was normal [Respiration, Rhythm And Depth] : normal respiratory rhythm and effort [Auscultation Breath Sounds / Voice Sounds] : lungs were clear to auscultation bilaterally [Heart Rate And Rhythm] : heart rate was normal and rhythm regular [Heart Sounds] : normal S1 and S2 [Edema] : there was no peripheral edema [Abdomen Soft] : soft [Involuntary Movements] : no involuntary movements were seen [Musculoskeletal - Swelling] : no joint swelling seen [] : no rash [FreeTextEntry1] : + coin-like purpuric lesions with minimal scale [No Focal Deficits] : no focal deficits [Oriented To Time, Place, And Person] : oriented to person, place, and time [Impaired Insight] : insight and judgment were intact

## 2023-08-18 LAB
24R-OH-CALCIDIOL SERPL-MCNC: 44.2 PG/ML
ALBUMIN MFR SERPL ELPH: 62 %
ALBUMIN SERPL ELPH-MCNC: 4.8 G/DL
ALBUMIN SERPL-MCNC: 4.6 G/DL
ALBUMIN/GLOB SERPL: 1.6 RATIO
ALP BLD-CCNC: 96 U/L
ALPHA1 GLOB MFR SERPL ELPH: 3.7 %
ALPHA1 GLOB SERPL ELPH-MCNC: 0.3 G/DL
ALPHA2 GLOB MFR SERPL ELPH: 8.6 %
ALPHA2 GLOB SERPL ELPH-MCNC: 0.6 G/DL
ALT SERPL-CCNC: 22 U/L
ANION GAP SERPL CALC-SCNC: 12 MMOL/L
APPEARANCE: CLEAR
AST SERPL-CCNC: 24 U/L
B-GLOBULIN MFR SERPL ELPH: 10.8 %
B-GLOBULIN SERPL ELPH-MCNC: 0.8 G/DL
BACTERIA: NEGATIVE /HPF
BASOPHILS # BLD AUTO: 0.03 K/UL
BASOPHILS NFR BLD AUTO: 0.7 %
BILIRUB SERPL-MCNC: 0.7 MG/DL
BILIRUBIN URINE: NEGATIVE
BLOOD URINE: NEGATIVE
BUN SERPL-MCNC: 14 MG/DL
C3 SERPL-MCNC: 101 MG/DL
C4 SERPL-MCNC: 24 MG/DL
CALCIUM SERPL-MCNC: 9.5 MG/DL
CAST: 0 /LPF
CHLORIDE SERPL-SCNC: 103 MMOL/L
CO2 SERPL-SCNC: 27 MMOL/L
COLOR: YELLOW
CREAT SERPL-MCNC: 1.11 MG/DL
CREAT SPEC-SCNC: 104 MG/DL
CREAT/PROT UR: 0.1 RATIO
CRP SERPL-MCNC: <3 MG/L
CRYOCRIT SER SPUN WESTERGREN: 0 MM
CRYOGLOB SERPL-MCNC: NEGATIVE
DEPRECATED KAPPA LC FREE/LAMBDA SER: 1.94 RATIO
EGFR: 77 ML/MIN/1.73M2
EOSINOPHIL # BLD AUTO: 0.06 K/UL
EOSINOPHIL NFR BLD AUTO: 1.5 %
EPITHELIAL CELLS: 0 /HPF
ERYTHROCYTE [SEDIMENTATION RATE] IN BLOOD BY WESTERGREN METHOD: 11 MM/HR
GAMMA GLOB FLD ELPH-MCNC: 1.1 G/DL
GAMMA GLOB MFR SERPL ELPH: 14.9 %
GLUCOSE QUALITATIVE U: NEGATIVE MG/DL
GLUCOSE SERPL-MCNC: 90 MG/DL
HCT VFR BLD CALC: 47.2 %
HCV AB SER QL: NONREACTIVE
HCV S/CO RATIO: 0.13 S/CO
HGB BLD-MCNC: 15.5 G/DL
IGA SER QL IEP: 240 MG/DL
IGG SER QL IEP: 1096 MG/DL
IGM SER QL IEP: 40 MG/DL
IMM GRANULOCYTES NFR BLD AUTO: 0.2 %
INTERPRETATION SERPL IEP-IMP: NORMAL
KAPPA LC CSF-MCNC: 1.72 MG/DL
KAPPA LC SERPL-MCNC: 3.33 MG/DL
KETONES URINE: NEGATIVE MG/DL
LEUKOCYTE ESTERASE URINE: NEGATIVE
LYMPHOCYTES # BLD AUTO: 0.89 K/UL
LYMPHOCYTES NFR BLD AUTO: 21.6 %
M PROTEIN SPEC IFE-MCNC: NORMAL
MAN DIFF?: NORMAL
MCHC RBC-ENTMCNC: 32.5 PG
MCHC RBC-ENTMCNC: 32.8 GM/DL
MCV RBC AUTO: 99 FL
MICROSCOPIC-UA: NORMAL
MONOCYTES # BLD AUTO: 0.47 K/UL
MONOCYTES NFR BLD AUTO: 11.4 %
NEUTROPHILS # BLD AUTO: 2.66 K/UL
NEUTROPHILS NFR BLD AUTO: 64.6 %
NITRITE URINE: NEGATIVE
PH URINE: 7
PLATELET # BLD AUTO: 236 K/UL
POTASSIUM SERPL-SCNC: 4.3 MMOL/L
PROT SERPL-MCNC: 7.4 G/DL
PROT UR-MCNC: 6 MG/DL
PROTEIN URINE: NEGATIVE MG/DL
RBC # BLD: 4.77 M/UL
RBC # FLD: 12.9 %
RED BLOOD CELLS URINE: 1 /HPF
SODIUM SERPL-SCNC: 142 MMOL/L
SPECIFIC GRAVITY URINE: 1.01
UROBILINOGEN URINE: 0.2 MG/DL
WBC # FLD AUTO: 4.12 K/UL
WHITE BLOOD CELLS URINE: 0 /HPF

## 2023-09-19 LAB
CRP SERPL-MCNC: <3 MG/L
ERYTHROCYTE [SEDIMENTATION RATE] IN BLOOD BY WESTERGREN METHOD: 5 MM/HR

## 2023-09-20 ENCOUNTER — APPOINTMENT (OUTPATIENT)
Dept: RHEUMATOLOGY | Facility: CLINIC | Age: 58
End: 2023-09-20
Payer: COMMERCIAL

## 2023-09-20 VITALS
TEMPERATURE: 98.2 F | SYSTOLIC BLOOD PRESSURE: 130 MMHG | BODY MASS INDEX: 29.42 KG/M2 | RESPIRATION RATE: 16 BRPM | DIASTOLIC BLOOD PRESSURE: 84 MMHG | WEIGHT: 222 LBS | HEIGHT: 73 IN | HEART RATE: 71 BPM | OXYGEN SATURATION: 98 %

## 2023-09-20 DIAGNOSIS — Z79.899 OTHER LONG TERM (CURRENT) DRUG THERAPY: ICD-10-CM

## 2023-09-20 DIAGNOSIS — Z87.39 PERSONAL HISTORY OF OTHER DISEASES OF THE MUSCULOSKELETAL SYSTEM AND CONNECTIVE TISSUE: ICD-10-CM

## 2023-09-20 PROCEDURE — 99213 OFFICE O/P EST LOW 20 MIN: CPT

## 2023-09-21 PROBLEM — Z79.899 HIGH RISK MEDICATION USE: Status: RESOLVED | Noted: 2023-06-02 | Resolved: 2023-09-21

## 2023-09-21 PROBLEM — Z87.39 HISTORY OF GIANT CELL ARTERITIS: Status: RESOLVED | Noted: 2023-06-02 | Resolved: 2023-09-21

## 2023-12-11 ENCOUNTER — APPOINTMENT (OUTPATIENT)
Dept: NEUROLOGY | Facility: CLINIC | Age: 58
End: 2023-12-11
Payer: COMMERCIAL

## 2023-12-11 VITALS
WEIGHT: 227 LBS | HEART RATE: 67 BPM | SYSTOLIC BLOOD PRESSURE: 134 MMHG | BODY MASS INDEX: 30.09 KG/M2 | DIASTOLIC BLOOD PRESSURE: 80 MMHG | HEIGHT: 73 IN

## 2023-12-11 DIAGNOSIS — G45.3 AMAUROSIS FUGAX: ICD-10-CM

## 2023-12-11 PROCEDURE — 99213 OFFICE O/P EST LOW 20 MIN: CPT

## 2024-04-16 ENCOUNTER — TRANSCRIPTION ENCOUNTER (OUTPATIENT)
Age: 59
End: 2024-04-16

## 2024-04-28 ENCOUNTER — NON-APPOINTMENT (OUTPATIENT)
Age: 59
End: 2024-04-28

## 2024-04-29 ENCOUNTER — APPOINTMENT (OUTPATIENT)
Dept: NEUROLOGY | Facility: CLINIC | Age: 59
End: 2024-04-29
Payer: COMMERCIAL

## 2024-04-29 VITALS
HEART RATE: 69 BPM | SYSTOLIC BLOOD PRESSURE: 150 MMHG | WEIGHT: 230 LBS | HEIGHT: 73 IN | BODY MASS INDEX: 30.48 KG/M2 | DIASTOLIC BLOOD PRESSURE: 84 MMHG

## 2024-04-29 DIAGNOSIS — E34.8 OTHER SPECIFIED ENDOCRINE DISORDERS: ICD-10-CM

## 2024-04-29 PROCEDURE — 99214 OFFICE O/P EST MOD 30 MIN: CPT

## 2024-04-29 NOTE — HISTORY OF PRESENT ILLNESS
[FreeTextEntry1] : 57 year old man with remote episode of amaurosis fugax, presenting to stroke neurology with abnormal findings on imaging.   Patient was being seen by his PMD for new tinnitus.  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  Patient was reassured by his PMD, but wished to see neurology to follow up on the imaging.    No neurological concerns to report.  The tinnitus has been intermittent, described as a high frequency pulsation.  No hearing loss, no ear pain.  No other neuro complaints, including new headaches, visual changes, weakness, or gait imbalance.    He reports that the finding has caused him a great deal of distress.  No other medical complaints, including fevers, chills, weight loss, chest pain, shortness of breath, or swelling in extremities.    Of note, prior suspicion for GCA, was briefly on prednisone, following with rheumatology, but since then, has not had the diagnosis confirmed on biopsy, and discontinued steroids.  Following serum markers intermittently.    On exam: GEN: NAD CV: RRR, S1, S2 PULM: CTAB HEENT: no temporal or jaw tenderness NEURO: Awake, alert, oriented, follows commands, giving a clear history, speaking fluently, normal word choice. Good insight, and reasoning. Verbose. PUpils 3-2mm, symmetric, full VF's, normal fundus, full EOM, no nystagmus, no facial weakness, no dysarthria, tongue midline. MOTOR: no drift. COORDINATION: normal FNF REFLEXES: trace BB, Br, patellar, 1+ achilles.  MRI brain images reviewed: no diffusion restriction. Small focus of FLAIR hyperintensity in the white matter of the frontal parasaggital cortex. CTA H&N images reviewd: No significant cervical carotid or vertebral stenosis.

## 2024-04-29 NOTE — ASSESSMENT
[FreeTextEntry1] :    57 year old man with an episode of amurosis fugax, suspicion initially for GCA, was placed on steroids for several months. Temporal artery biopsy normal. Off steroids with no return of symptoms and stable inflammatory markers. Returning today with concern of MRI findings of pineal gland cyst.   Normal neurological exam. CTA without atherosclerotic or other vasculopathic changes.  -will repeat MRI brain w/wo contrast to be performed in July/August 2024 to monitor for stability of pineal cysts -RTC in August

## 2024-07-29 ENCOUNTER — EMERGENCY (EMERGENCY)
Facility: HOSPITAL | Age: 59
LOS: 1 days | Discharge: HOME | End: 2024-07-29
Attending: STUDENT IN AN ORGANIZED HEALTH CARE EDUCATION/TRAINING PROGRAM
Payer: COMMERCIAL

## 2024-07-29 VITALS
HEART RATE: 62 BPM | RESPIRATION RATE: 18 BRPM | DIASTOLIC BLOOD PRESSURE: 83 MMHG | SYSTOLIC BLOOD PRESSURE: 145 MMHG | OXYGEN SATURATION: 100 % | TEMPERATURE: 98 F

## 2024-07-29 VITALS
OXYGEN SATURATION: 100 % | RESPIRATION RATE: 18 BRPM | DIASTOLIC BLOOD PRESSURE: 90 MMHG | HEART RATE: 57 BPM | TEMPERATURE: 98 F | SYSTOLIC BLOOD PRESSURE: 151 MMHG

## 2024-07-29 DIAGNOSIS — Z98.890 OTHER SPECIFIED POSTPROCEDURAL STATES: Chronic | ICD-10-CM

## 2024-07-29 DIAGNOSIS — S82.891A OTHER FRACTURE OF RIGHT LOWER LEG, INITIAL ENCOUNTER FOR CLOSED FRACTURE: Chronic | ICD-10-CM

## 2024-07-29 LAB
ALBUMIN SERPL ELPH-MCNC: 4.1 G/DL — SIGNIFICANT CHANGE UP (ref 3.3–5)
ALP SERPL-CCNC: 85 U/L — SIGNIFICANT CHANGE UP (ref 40–120)
ALT FLD-CCNC: 22 U/L — SIGNIFICANT CHANGE UP (ref 10–45)
ANION GAP SERPL CALC-SCNC: 12 MMOL/L — SIGNIFICANT CHANGE UP (ref 5–17)
ANION GAP SERPL CALC-SCNC: 9 MMOL/L — SIGNIFICANT CHANGE UP (ref 5–17)
APTT BLD: 30.1 SEC — SIGNIFICANT CHANGE UP (ref 24.5–35.6)
AST SERPL-CCNC: 45 U/L — HIGH (ref 10–40)
BASOPHILS # BLD AUTO: 0 K/UL — SIGNIFICANT CHANGE UP (ref 0–0.2)
BASOPHILS NFR BLD AUTO: 0 % — SIGNIFICANT CHANGE UP (ref 0–2)
BILIRUB SERPL-MCNC: 0.6 MG/DL — SIGNIFICANT CHANGE UP (ref 0.2–1.2)
BUN SERPL-MCNC: 15 MG/DL — SIGNIFICANT CHANGE UP (ref 7–23)
BUN SERPL-MCNC: 16 MG/DL — SIGNIFICANT CHANGE UP (ref 7–23)
CALCIUM SERPL-MCNC: 9.1 MG/DL — SIGNIFICANT CHANGE UP (ref 8.4–10.5)
CALCIUM SERPL-MCNC: 9.4 MG/DL — SIGNIFICANT CHANGE UP (ref 8.4–10.5)
CHLORIDE SERPL-SCNC: 104 MMOL/L — SIGNIFICANT CHANGE UP (ref 96–108)
CHLORIDE SERPL-SCNC: 106 MMOL/L — SIGNIFICANT CHANGE UP (ref 96–108)
CO2 SERPL-SCNC: 22 MMOL/L — SIGNIFICANT CHANGE UP (ref 22–31)
CO2 SERPL-SCNC: 24 MMOL/L — SIGNIFICANT CHANGE UP (ref 22–31)
CREAT SERPL-MCNC: 0.97 MG/DL — SIGNIFICANT CHANGE UP (ref 0.5–1.3)
CREAT SERPL-MCNC: 0.98 MG/DL — SIGNIFICANT CHANGE UP (ref 0.5–1.3)
DACRYOCYTES BLD QL SMEAR: SLIGHT — SIGNIFICANT CHANGE UP
EGFR: 89 ML/MIN/1.73M2 — SIGNIFICANT CHANGE UP
EGFR: 90 ML/MIN/1.73M2 — SIGNIFICANT CHANGE UP
ELLIPTOCYTES BLD QL SMEAR: SLIGHT — SIGNIFICANT CHANGE UP
EOSINOPHIL # BLD AUTO: 0.03 K/UL — SIGNIFICANT CHANGE UP (ref 0–0.5)
EOSINOPHIL NFR BLD AUTO: 0.9 % — SIGNIFICANT CHANGE UP (ref 0–6)
GLUCOSE SERPL-MCNC: 100 MG/DL — HIGH (ref 70–99)
GLUCOSE SERPL-MCNC: 98 MG/DL — SIGNIFICANT CHANGE UP (ref 70–99)
HCT VFR BLD CALC: 45.2 % — SIGNIFICANT CHANGE UP (ref 39–50)
HGB BLD-MCNC: 14.9 G/DL — SIGNIFICANT CHANGE UP (ref 13–17)
INR BLD: 1.05 RATIO — SIGNIFICANT CHANGE UP (ref 0.85–1.18)
LYMPHOCYTES # BLD AUTO: 0.77 K/UL — LOW (ref 1–3.3)
LYMPHOCYTES # BLD AUTO: 23.7 % — SIGNIFICANT CHANGE UP (ref 13–44)
MANUAL SMEAR VERIFICATION: SIGNIFICANT CHANGE UP
MCHC RBC-ENTMCNC: 30.8 PG — SIGNIFICANT CHANGE UP (ref 27–34)
MCHC RBC-ENTMCNC: 33 GM/DL — SIGNIFICANT CHANGE UP (ref 32–36)
MCV RBC AUTO: 93.6 FL — SIGNIFICANT CHANGE UP (ref 80–100)
MONOCYTES # BLD AUTO: 0.49 K/UL — SIGNIFICANT CHANGE UP (ref 0–0.9)
MONOCYTES NFR BLD AUTO: 14.9 % — HIGH (ref 2–14)
NEUTROPHILS # BLD AUTO: 1.98 K/UL — SIGNIFICANT CHANGE UP (ref 1.8–7.4)
NEUTROPHILS NFR BLD AUTO: 60.5 % — SIGNIFICANT CHANGE UP (ref 43–77)
PLAT MORPH BLD: NORMAL — SIGNIFICANT CHANGE UP
PLATELET # BLD AUTO: 219 K/UL — SIGNIFICANT CHANGE UP (ref 150–400)
POIKILOCYTOSIS BLD QL AUTO: SLIGHT — SIGNIFICANT CHANGE UP
POTASSIUM SERPL-MCNC: 4.2 MMOL/L — SIGNIFICANT CHANGE UP (ref 3.5–5.3)
POTASSIUM SERPL-MCNC: 5.9 MMOL/L — HIGH (ref 3.5–5.3)
POTASSIUM SERPL-SCNC: 4.2 MMOL/L — SIGNIFICANT CHANGE UP (ref 3.5–5.3)
POTASSIUM SERPL-SCNC: 5.9 MMOL/L — HIGH (ref 3.5–5.3)
PROT SERPL-MCNC: 7.5 G/DL — SIGNIFICANT CHANGE UP (ref 6–8.3)
PROTHROM AB SERPL-ACNC: 11.5 SEC — SIGNIFICANT CHANGE UP (ref 9.5–13)
RBC # BLD: 4.83 M/UL — SIGNIFICANT CHANGE UP (ref 4.2–5.8)
RBC # FLD: 12.6 % — SIGNIFICANT CHANGE UP (ref 10.3–14.5)
RBC BLD AUTO: ABNORMAL
SODIUM SERPL-SCNC: 138 MMOL/L — SIGNIFICANT CHANGE UP (ref 135–145)
SODIUM SERPL-SCNC: 139 MMOL/L — SIGNIFICANT CHANGE UP (ref 135–145)
TROPONIN T, HIGH SENSITIVITY RESULT: <6 NG/L — SIGNIFICANT CHANGE UP (ref 0–51)
WBC # BLD: 3.27 K/UL — LOW (ref 3.8–10.5)
WBC # FLD AUTO: 3.27 K/UL — LOW (ref 3.8–10.5)

## 2024-07-29 PROCEDURE — 99285 EMERGENCY DEPT VISIT HI MDM: CPT | Mod: 25

## 2024-07-29 PROCEDURE — 93005 ELECTROCARDIOGRAM TRACING: CPT

## 2024-07-29 PROCEDURE — 85610 PROTHROMBIN TIME: CPT

## 2024-07-29 PROCEDURE — 82435 ASSAY OF BLOOD CHLORIDE: CPT

## 2024-07-29 PROCEDURE — 70498 CT ANGIOGRAPHY NECK: CPT | Mod: MC

## 2024-07-29 PROCEDURE — 70496 CT ANGIOGRAPHY HEAD: CPT | Mod: MC

## 2024-07-29 PROCEDURE — 70450 CT HEAD/BRAIN W/O DYE: CPT | Mod: 26,59,MC

## 2024-07-29 PROCEDURE — 85730 THROMBOPLASTIN TIME PARTIAL: CPT

## 2024-07-29 PROCEDURE — 80053 COMPREHEN METABOLIC PANEL: CPT

## 2024-07-29 PROCEDURE — 70450 CT HEAD/BRAIN W/O DYE: CPT | Mod: MC

## 2024-07-29 PROCEDURE — 85014 HEMATOCRIT: CPT

## 2024-07-29 PROCEDURE — 84132 ASSAY OF SERUM POTASSIUM: CPT

## 2024-07-29 PROCEDURE — 70496 CT ANGIOGRAPHY HEAD: CPT | Mod: 26,MC

## 2024-07-29 PROCEDURE — 83605 ASSAY OF LACTIC ACID: CPT

## 2024-07-29 PROCEDURE — 80048 BASIC METABOLIC PNL TOTAL CA: CPT

## 2024-07-29 PROCEDURE — 82803 BLOOD GASES ANY COMBINATION: CPT

## 2024-07-29 PROCEDURE — 85025 COMPLETE CBC W/AUTO DIFF WBC: CPT

## 2024-07-29 PROCEDURE — 36000 PLACE NEEDLE IN VEIN: CPT | Mod: XU

## 2024-07-29 PROCEDURE — 82962 GLUCOSE BLOOD TEST: CPT

## 2024-07-29 PROCEDURE — 84484 ASSAY OF TROPONIN QUANT: CPT

## 2024-07-29 PROCEDURE — 70498 CT ANGIOGRAPHY NECK: CPT | Mod: 26,MC

## 2024-07-29 PROCEDURE — 99285 EMERGENCY DEPT VISIT HI MDM: CPT

## 2024-07-29 PROCEDURE — 85018 HEMOGLOBIN: CPT

## 2024-07-29 PROCEDURE — 82330 ASSAY OF CALCIUM: CPT

## 2024-07-29 PROCEDURE — 82947 ASSAY GLUCOSE BLOOD QUANT: CPT

## 2024-07-29 PROCEDURE — 84295 ASSAY OF SERUM SODIUM: CPT

## 2024-07-29 NOTE — ED PROVIDER NOTE - PATIENT PORTAL LINK FT
You can access the FollowMyHealth Patient Portal offered by [facility] by registering at the following website: [email or web address]. By joining [employer]’s FollowMyHealth portal, you will also be able to view your health information using other applications (apps) compatible with our system.

## 2024-07-29 NOTE — CONSULT NOTE ADULT - ATTENDING COMMENTS
DOS 7/29  code stroke called and neurology emergently assessed jean claude   Briefly     59y  man with   anxiety, previous episode of suspected amarousis fugax in 05/2023 w/ R eye (seen by outpatient neurologist Dr. Negrete, most recently 05/2024), who presented today with similar episode of visual field "spottiness" in the inferior visual field quadrants of the L eye this time. Of note this episode started around 6:30, patient was in usual state of health at 06:00 today. He states this is similar to his previous episode in 05/2023 but in the L eye, and not associated with headache. At time of evaluation today, he endorses that vision is completely back to normal. He was anxious and endorsed that he is very nervous at baseline. He endorses intermittent parasthesias periorally, but states that he knows this could be related to hyperventilation. NIHSS 0 on my exam. At the time of his R eye symptoms last year, he underwent extensive work-up with Optho for GCA, s/p temporal artery biopsy 5/17 with Dr. Ernandez. He had prior to 05/2023 previous similar episodes over the past 1-2 years (2-4 episodes) alternating between both eyes but he became concerned after developing severe bilateral headache, scalp tenderness and periorbital "pressure" around the right eye. He developed a "shade/curtain" over his vision starting from his inferior visual field and extending upwards, excluding the most superior portion of his vision. Went to PCP and was found to have elevated inflammatory markers. Was started on 60mg prednisone for empiric treatment of GCA on 5/16 with resolution of symptoms. Pt has elevated outpatient ESR/CRP, s/p negative temporal artery biopsy. Received also 1x of solumedrol 250 mg. Op  Prior workup:  CT head 5/20: Mild volume loss, microvascular disease, no acute hemorrhage or midline shift.  CTA head and neck 5/20: Patent intracranial circulation without flow limiting stenosis. Patent, ECAs, ICAs, no  hemodynamically significant stenosis.  Bilateral vertebral arteries are patent without flow limiting stenosis.  MRI Brain and orbits w/w/o 5/22: No acute findings in the brain or orbits. No infarcts. No abnormal mural thickening or enhancement of the superficial temporal arteries. Small subcortical white matter focus likely chronic microvascular ischemic changes  TTE w/ bubble: EF 67%, negative for PFO.  Saw Dr. 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 Negrete provided reassurance and set up for return to clinic in 08/2024 and MRI Brain with and without contrast in July or August 2024.   NIHSS 0  LKW: 06:00   mRS 0  This admission  CT Head and CTA H/N done today 7/29 were unremarkable.     Not a tpa candidate given symptom resolution. Not a thrombectomy candidate given no large vessel occlusion on CTA H/N.      IMPRESSION: Episode of transient vision loss in L eye. Prior workup for GCA showed elevated ESR/CRP with negative temporal artery bx. DDx include amarousis fugax vs primary opthalmologic dx vs BRAO    Recommendations:  - ASA 81mg daily   - Please consult opthalmology for dilated fundoscopy and rule out primary opthalmologic ddx   - ESR/CRP  - HgbA1c and lipid profile.  - Atorvastatin 80MG QHS, titrate to LDL<70  - MR Brain w/w/o contrast can be done outpatient.   - When patient is ready for dc, should follow up with Dr. Arthur Negrete neurology outpatient at [address] [place] [address] after discharge. He has an appointment in August, can call [phone] if he wishes to follow up sooner.  likely discharge from ED with outpatient f/u based on above    Bk Foy MD  Vascular Neurology  Office: [phone] DOS 7/29  code stroke called and neurology emergently assessed jean claude   Briefly     59y  man with   anxiety, previous episode of suspected amarousis fugax in 05/2023 w/ R eye (seen by outpatient neurologist Dr. Negrete, most recently 05/2024), who presented today with similar episode of visual field "spottiness" in the inferior visual field quadrants of the L eye this time. Of note this episode started around 6:30, patient was in usual state of health at 06:00 today. He states this is similar to his previous episode in 05/2023 but in the L eye, and not associated with headache. At time of evaluation today, he endorses that vision is completely back to normal. He was anxious and endorsed that he is very nervous at baseline. He endorses intermittent parasthesias periorally, but states that he knows this could be related to hyperventilation. NIHSS 0 on my exam. At the time of his R eye symptoms last year, he underwent extensive work-up with Optho for GCA, s/p temporal artery biopsy 5/17 with Dr. Ernandez. He had prior to 05/2023 previous similar episodes over the past 1-2 years (2-4 episodes) alternating between both eyes but he became concerned after developing severe bilateral headache, scalp tenderness and periorbital "pressure" around the right eye. He developed a "shade/curtain" over his vision starting from his inferior visual field and extending upwards, excluding the most superior portion of his vision. Went to PCP and was found to have elevated inflammatory markers. Was started on 60mg prednisone for empiric treatment of GCA on 5/16 with resolution of symptoms. Pt has elevated outpatient ESR/CRP, s/p negative temporal artery biopsy. Received also 1x of solumedrol 250 mg. Op  Prior workup:  CT head 5/20: Mild volume loss, microvascular disease, no acute hemorrhage or midline shift.  CTA head and neck 5/20: Patent intracranial circulation without flow limiting stenosis. Patent, ECAs, ICAs, no  hemodynamically significant stenosis.  Bilateral vertebral arteries are patent without flow limiting stenosis.  MRI Brain and orbits w/w/o 5/22: No acute findings in the brain or orbits. No infarcts. No abnormal mural thickening or enhancement of the superficial temporal arteries. Small subcortical white matter focus likely chronic microvascular ischemic changes  TTE w/ bubble: EF 67%, negative for PFO.  Saw Dr. 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 Negrete provided reassurance and set up for return to clinic in 08/2024 and MRI Brain with and without contrast in July or August 2024.   NIHSS 0  LKW: 06:00   mRS 0  This admission  CT Head and CTA H/N done today 7/29 were unremarkable.     Not a tpa candidate given symptom resolution. Not a thrombectomy candidate given no large vessel occlusion on CTA H/N.      IMPRESSION: Episode of transient vision loss in L eye. Prior workup for GCA showed elevated ESR/CRP with negative temporal artery bx. DDx include amarousis fugax vs primary opthalmologic dx vs BRAO    Recommendations:  - ASA 81mg daily for now for possible TIA   - Please consult opthalmology for dilated fundoscopy and rule out primary opthalmologic ddx   - ESR/CRP; if elevated consider rheum for steroids but symptoms resolved   - HgbA1c and lipid profile.  - Atorvastatin 80MG QHS, titrate to LDL<70  - MR Brain w/w/o contrast can be done outpatient.   - When patient is ready for dc, should follow up with Dr. Arthur Negrete neurology outpatient at [address] [place] [address] after discharge. He has an appointment in August, can call [phone] if he wishes to follow up sooner.  likely discharge from ED with outpatient f/u based on above    Bk Foy MD  Vascular Neurology  Office: [phone]

## 2024-07-29 NOTE — ED ADULT NURSE NOTE - OBJECTIVE STATEMENT
Pt is a 59 yr old male coming from home via ems for left sided visual loss. Pt states he woke up at 0600 and was at his baseline- around 0630 he went downstairs in his house to begin working-out and had sudden left sided lower visual loss. Pt states he had a similar episode last year in June but it was his right eye. Pt was admitted then and had MRI and temporal artery biopsy/treated for temporal arteritis. currently, pt symptoms resolved- pt states the episode lasted about 7 mins. Pt also endorses having anxiety- and states he feels like sometimes this happens due to his increased anxiety. PT is a/ox 3- ambulatory independently- vitals stable. Pt is a 59 yr old male coming from home via ems for left sided visual loss. Pt states he woke up at 0600 and was at his baseline- around 0630 he went downstairs in his house to begin working-out and had sudden left sided lower visual loss. Pt states he had a similar episode last year in June but it was his right eye. Pt was admitted then and had MRI and temporal artery biopsy/treated for temporal arteritis. currently, pt symptoms resolved- pt states the episode lasted about 7 mins. Pt also endorses having anxiety- endorses facial tingling which happens when hes anxious. PT is a/ox 3- ambulatory independently- vitals stable.

## 2024-07-29 NOTE — CONSULT NOTE ADULT - ASSESSMENT
Patient ROSALEE MUELLER is a 59y (1965) man with a PMHx significant for anxiety, previous episode of suspected amarousis fugax in 05/2023 w/ R eye (seen by outpatient neurologist Dr. Negrete, most recently 05/2024), who presented today with similar episode of visual field "spottiness" in the inferior visual field quadrants of the L eye this time. Of note this episode started around 6:30, patient was in usual state of health at 06:00 today. He states this is similar to his previous episode in 05/2023 but in the L eye, and not associated with headache. At time of evaluation today, he endorses that vision is completely back to normal. He was anxious and endorsed that he is very nervous at baseline. He endorses intermittent parasthesias periorally, but states that he knows this could be related to hyperventilation. NIHSS 0 on my exam. At the time of his R eye symptoms last year, he underwent extensive work-up with Optho for GCA, s/p temporal artery biopsy 5/17 with Dr. Ernandez. He had prior to 05/2023 previous similar episodes over the past 1-2 years (2-4 episodes) alternating between both eyes but he became concerned after developing severe bilateral headache, scalp tenderness and periorbital "pressure" around the right eye. He developed a "shade/curtain" over his vision starting from his inferior visual field and extending upwards, excluding the most superior portion of his vision. Went to PCP and was found to have elevated inflammatory markers. Was started on 60mg prednisone for empiric treatment of GCA on 5/16 with resolution of symptoms. Pt has elevated outpatient ESR/CRP, s/p negative temporal artery biopsy. Received also 1x of solumedrol 250 mg. Op    CT head 5/20: Mild volume loss, microvascular disease, no acute hemorrhage or midline shift.  CTA head and neck 5/20: Patent intracranial circulation without flow limiting stenosis. Patent, ECAs, ICAs, no  hemodynamically significant stenosis.  Bilateral vertebral arteries are patent without flow limiting stenosis.  MRI Brain and orbits w/w/o 5/22: No acute findings in the brain or orbits. No infarcts. No abnormal mural thickening or enhancement of the superficial temporal arteries. Small subcortical white matter focus likely chronic microvascular ischemic changes  TTE w/ bubble: EF 67%, negative for PFO.    Saw Dr. 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 Negrete provided reassurance and set up for return to clinic in 08/2024 and MRI Brain with and without contrast in July or August 2024.     NIHSS 0  LKW: 06:00   mRS 0    Not a tpa candidate given symptom resolution. Not a thrombectomy candidate given no large vessel occlusion on CTA H/N.      IMPRESSION: Episode of transient vision loss in L eye. Prior workup for GCA showed elevated ESR/CRP with negative temporal artery bx. DDx include amarousis fugax vs primary opthamalogic dx.    Recommendations:  [] MRI Brain with and without contrast was recommended to be done outpatient by neurologist Dr. Negrete in 08/2024  [] Repeat ESR/CRP  [] HgbA1c and lipid profile.  [] Atorvastatin 80MG QHS, titrate to LDL<70  [] Permissive HTN up to 220/120 mmHg for first 24 hours after symptom onset followed by gradual normotension.       Case discussed with stroke fellow Sha Mayberry under supervision of attending.  Note incomplete until finalized by attending signature/attestation.     Patient ROSALEE MUELLER is a 59y (1965) man with a PMHx significant for anxiety, previous episode of suspected amarousis fugax in 05/2023 w/ R eye (seen by outpatient neurologist Dr. Negrete, most recently 05/2024), who presented today with similar episode of visual field "spottiness" in the inferior visual field quadrants of the L eye this time. Of note this episode started around 6:30, patient was in usual state of health at 06:00 today. He states this is similar to his previous episode in 05/2023 but in the L eye, and not associated with headache. At time of evaluation today, he endorses that vision is completely back to normal. He was anxious and endorsed that he is very nervous at baseline. He endorses intermittent parasthesias periorally, but states that he knows this could be related to hyperventilation. NIHSS 0 on my exam. At the time of his R eye symptoms last year, he underwent extensive work-up with Optho for GCA, s/p temporal artery biopsy 5/17 with Dr. Ernandez. He had prior to 05/2023 previous similar episodes over the past 1-2 years (2-4 episodes) alternating between both eyes but he became concerned after developing severe bilateral headache, scalp tenderness and periorbital "pressure" around the right eye. He developed a "shade/curtain" over his vision starting from his inferior visual field and extending upwards, excluding the most superior portion of his vision. Went to PCP and was found to have elevated inflammatory markers. Was started on 60mg prednisone for empiric treatment of GCA on 5/16 with resolution of symptoms. Pt has elevated outpatient ESR/CRP, s/p negative temporal artery biopsy. Received also 1x of solumedrol 250 mg. Op    CT head 5/20: Mild volume loss, microvascular disease, no acute hemorrhage or midline shift.  CTA head and neck 5/20: Patent intracranial circulation without flow limiting stenosis. Patent, ECAs, ICAs, no  hemodynamically significant stenosis.  Bilateral vertebral arteries are patent without flow limiting stenosis.  MRI Brain and orbits w/w/o 5/22: No acute findings in the brain or orbits. No infarcts. No abnormal mural thickening or enhancement of the superficial temporal arteries. Small subcortical white matter focus likely chronic microvascular ischemic changes  TTE w/ bubble: EF 67%, negative for PFO.    Saw Dr. 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 Negrete provided reassurance and set up for return to clinic in 08/2024 and MRI Brain with and without contrast in July or August 2024.     NIHSS 0  LKW: 06:00   mRS 0    Not a tpa candidate given symptom resolution. Not a thrombectomy candidate given no large vessel occlusion on CTA H/N.      IMPRESSION: Episode of transient vision loss in L eye. Prior workup for GCA showed elevated ESR/CRP with negative temporal artery bx. DDx include amarousis fugax vs primary opthalmologic dx vs BRAO    Recommendations:  [] Please consult opthalmology for dilated fundoscopy and rule out primary opthamologic ddx   [] ESR/CRP  [] HgbA1c and lipid profile.  [] Atorvastatin 80MG QHS, titrate to LDL<70  [] MR Brain w/w/o contrast can be done outpatient.       Case discussed with stroke fellow Sha Mayberry under supervision of attending.  Note incomplete until finalized by attending signature/attestation.     Patient ROSALEE MUELLER is a 59y (1965) man with a PMHx significant for anxiety, previous episode of suspected amarousis fugax in 05/2023 w/ R eye (seen by outpatient neurologist Dr. Negrete, most recently 05/2024), who presented today with similar episode of visual field "spottiness" in the inferior visual field quadrants of the L eye this time. Of note this episode started around 6:30, patient was in usual state of health at 06:00 today. He states this is similar to his previous episode in 05/2023 but in the L eye, and not associated with headache. At time of evaluation today, he endorses that vision is completely back to normal. He was anxious and endorsed that he is very nervous at baseline. He endorses intermittent parasthesias periorally, but states that he knows this could be related to hyperventilation. NIHSS 0 on my exam. At the time of his R eye symptoms last year, he underwent extensive work-up with Optho for GCA, s/p temporal artery biopsy 5/17 with Dr. Ernandez. He had prior to 05/2023 previous similar episodes over the past 1-2 years (2-4 episodes) alternating between both eyes but he became concerned after developing severe bilateral headache, scalp tenderness and periorbital "pressure" around the right eye. He developed a "shade/curtain" over his vision starting from his inferior visual field and extending upwards, excluding the most superior portion of his vision. Went to PCP and was found to have elevated inflammatory markers. Was started on 60mg prednisone for empiric treatment of GCA on 5/16 with resolution of symptoms. Pt has elevated outpatient ESR/CRP, s/p negative temporal artery biopsy. Received also 1x of solumedrol 250 mg. Op    CT head 5/20: Mild volume loss, microvascular disease, no acute hemorrhage or midline shift.  CTA head and neck 5/20: Patent intracranial circulation without flow limiting stenosis. Patent, ECAs, ICAs, no  hemodynamically significant stenosis.  Bilateral vertebral arteries are patent without flow limiting stenosis.  MRI Brain and orbits w/w/o 5/22: No acute findings in the brain or orbits. No infarcts. No abnormal mural thickening or enhancement of the superficial temporal arteries. Small subcortical white matter focus likely chronic microvascular ischemic changes  TTE w/ bubble: EF 67%, negative for PFO.    Saw Dr. 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 Negrete provided reassurance and set up for return to clinic in 08/2024 and MRI Brain with and without contrast in July or August 2024.     NIHSS 0  LKW: 06:00   mRS 0    CT Head and CTA H/N done today 7/29 were unremarkable.     Not a tpa candidate given symptom resolution. Not a thrombectomy candidate given no large vessel occlusion on CTA H/N.      IMPRESSION: Episode of transient vision loss in L eye. Prior workup for GCA showed elevated ESR/CRP with negative temporal artery bx. DDx include amarousis fugax vs primary opthalmologic dx vs BRAO    Recommendations:  [] Please consult opthalmology for dilated fundoscopy and rule out primary opthalmologic ddx   [] ESR/CRP  [] HgbA1c and lipid profile.  [] Atorvastatin 80MG QHS, titrate to LDL<70  [] MR Brain w/w/o contrast can be done outpatient.   [] When patient is ready for dc, should follow up with Dr. Arthur Negrete neurology outpatient at [address] [place] [address] after discharge. He has an appointment in August, can call [phone] if he wishes to follow up sooner.        Case discussed with stroke fellow Sha Mayberry under supervision of attending.

## 2024-07-29 NOTE — ED PROVIDER NOTE - CARE PROVIDER_API CALL
Arthur Negrete  Neurology  [address] [facility] [facility] [address]  [place] [address]  Phone: [phone]  Fax: [phone]  Follow Up Time:

## 2024-07-29 NOTE — ED PROVIDER NOTE - OBJECTIVE STATEMENT
60 yo male PMH anxiety presenting to ED c/o left eye vision changes at apporx 0630 this morning. Patient  woke up asymptomatic, went to go work out and began to experience "spotty" vision" in his left eye. Patient states symptoms lasted approx 7 minutes before resolving completely. Denies any headache, recent falls/trauma, weakness/numbness, difficulties speaking or ambulating, lightheadedness/dizziness. endorses some facial tingling which patient states is a common symptom of his anxiety.  has experienced similar vision changes before in right eye, is being followed by a neurologist, received an MRI in April, and was recently worked up for GCA. Patient denies chest pain, palpitations, SOB, cough, abd pain, nausea, vomiting, diarrhea,  fevers/chills.

## 2024-07-29 NOTE — ED PROVIDER NOTE - PHYSICAL EXAMINATION
CONSTITUTIONAL: Appears well, in no apparent distress  HEAD: Normocephalic, no obvious signs of trauma  EENT: PERRL, EOMI w/o pain, no nystagmus, nares patent no drainage, no pharyngeal erythema, swelling, or exudates  NECK: Trachea midline, no goiter  RESP: L/S equal clr, bilat, apices and bases, no accessory muscle use, speaking full sentences  CARDIC: RRR, +S1/S2, no peripheral edema  GI: ABD soft, nondistended, nontender on palpation, no palpable masses  : No CVA Tenderness  MSK: 5/5 strength extremities x 4, full ROM without pain, no midline spinal tenderness on palpation  SKIN: No rashes, normal color/condition   NEURO: A&OX4, CN intact, No focal motor deficits/weakness, no sensory deficits, no dysmetria, no slurred speech, no facial droop, normal gait CONSTITUTIONAL: Appears well, in no apparent distress  HEAD: Normocephalic, no obvious signs of trauma  EENT: PERRL, EOMI w/o pain, no nystagmus, peripheral fields intact,  nares patent no drainage, no pharyngeal erythema, swelling, or exudates  NECK: Trachea midline, no goiter  RESP: L/S equal clr, bilat, apices and bases, no accessory muscle use, speaking full sentences  CARDIC: RRR, +S1/S2, no peripheral edema  GI: ABD soft, nondistended, nontender on palpation, no palpable masses  : No CVA Tenderness  MSK: 5/5 strength extremities x 4, full ROM without pain, no midline spinal tenderness on palpation  SKIN: No rashes, normal color/condition   NEURO: A&OX4, CN intact, No focal motor deficits/weakness, no sensory deficits, no dysmetria, no slurred speech, no facial droop, normal gait CONSTITUTIONAL: Appears well, in no apparent distress  HEAD: Normocephalic, no obvious signs of trauma  EENT: PERRL, EOMI w/o pain, no nystagmus, peripheral fields intact, 20/30 both eyes, 20/40 R eye, 20/40 L eye, nares patent no drainage, no pharyngeal erythema, swelling, or exudates  NECK: Trachea midline, no goiter  RESP: L/S equal clr, bilat, apices and bases, no accessory muscle use, speaking full sentences  CARDIC: RRR, +S1/S2, no peripheral edema  GI: ABD soft, nondistended, nontender on palpation, no palpable masses  : No CVA Tenderness  MSK: 5/5 strength extremities x 4, full ROM without pain, no midline spinal tenderness on palpation  SKIN: No rashes, normal color/condition   NEURO: A&OX4, CN intact, No focal motor deficits/weakness, no sensory deficits, no dysmetria, no slurred speech, no facial droop, normal gait

## 2024-07-29 NOTE — ED PROVIDER NOTE - NSFOLLOWUPINSTRUCTIONS_ED_ALL_ED_FT
- You were seen in the Emergency Department Today for vision changes  - Your Ct and lab results were normal   - Please follow up with opthalmology today in clinic as discussed   - Please follow up with Neurologist Dr. Arthur Negrete [phone]   - Please follow up with your primary care doctor as discussed  - Return to the Emergency Department IMMEDIATELY if you experience vision changes, lightheadedness/dizziness, you pass out, experience numbness/tingling/weakness, facial droop, difficulty speaking or walking, severe headache.      English    Blurred Vision, Adult  Eyeglasses hovering over a Snellen eye chart. The glasses reveal clear letters, while the other letters are blurry.  A person having an eye exam. Eye equipment is placed on the face.  Having blurred vision means that you cannot see things clearly. Your vision may seem fuzzy or out of focus. It can involve your vision for objects that are close or far away. It may affect one or both eyes. There are many causes of blurred vision, including cataracts, macular degeneration, eye inflammation (uveitis), and diabetic retinopathy.    In many cases, blurred vision has to do with the shape of your eye. An abnormal eye shape means you cannot focus well (refractive error). When this happens, it can cause:  Faraway objects to look blurry (nearsightedness).  Close objects to look blurry (farsightedness).  Blurry vision at any distance (astigmatism).  Refractive errors are often corrected with glasses or contacts.    If you have blurred vision, it is best to see an eye care specialist. Blurred vision can be diagnosed based on your symptoms and a complete eye exam. Tell your eye care specialist about any other health problems you have, any recent eye injury, and any prior surgeries. You may need to see an eye care specialist who specializes in medical and surgical eye problems (ophthalmologist). Your treatment will depend on what is causing your blurred vision.    Follow these instructions at home:  Keep all follow-up visits. This is important. These include any visits to your eye specialists.  Do not drive or use machinery if your vision is blurry.  Use eye drops only as told by your health care provider.  If you were prescribed glasses or contact lenses, wear the glasses or contacts as told by your health care provider.  Schedule eye exams regularly.  Pay attention to any changes in your symptoms.  Avoid rubbing your eyes.  Contact a health care provider if:  Your symptoms do not improve or they get worse.  You have:  New symptoms.  A headache.  Trouble seeing at night.  Trouble noticing the difference between colors.  You notice:  Drooping of your eyelids.  Drainage coming from your eyes.  A rash around your eyes.  Get help right away if:  You have:  Severe eye pain.  A severe headache.  A sudden change in vision.  A sudden loss of vision.  A vision change after an injury.  You notice flashing lights in your field of vision. Your field of vision is the area that you can see without moving your eyes.  Summary  Having blurred vision means that you cannot see things clearly. Your vision may seem fuzzy or out of focus.  There are many causes of blurred vision. In many cases, blurred vision has to do with an abnormal eye shape (refractive error), and it can be corrected with glasses or contact lenses.  Pay attention to any changes in your symptoms. Contact a health care provider if your symptoms do not improve or if you have any new symptoms.  This information is not intended to replace advice given to you by your health care provider. Make sure you discuss any questions you have with your health care provider.

## 2024-07-29 NOTE — CONSULT NOTE ADULT - SUBJECTIVE AND OBJECTIVE BOX
Neurology - Consult Note    -  Spectra: [phone] ([facility]), [phone] ([facility])  -    HPI: Patient ROSALEE MUELLER is a 59y (1965) man with a PMHx significant for anxiety, previous episode of suspected amarousis fugax in 05/2023 w/ R eye (seen by outpatient neurologist Dr. Negrete, most recently 05/2024), who presented today with similar episode of visual field "spottiness" in the inferior visual field quadrants of the L eye this time. Of note this episode started around 6:30, patient was in usual state of health at 06:00 today. He states this is similar to his previous episode in 05/2023 but in the L eye, and not associated with headache. At time of evaluation today, he endorses that vision is completely back to normal. He was anxious and endorsed that he is very nervous at baseline. He endorses intermittent parasthesias periorally, but states that he knows this could be related to hyperventilation. NIHSS 0 on my exam. At the time of his R eye symptoms last year, he underwent extensive work-up with Optho for GCA, s/p temporal artery biopsy 5/17 with Dr. Ernandez. He had prior to 05/2023 previous similar episodes over the past 1-2 years (2-4 episodes) alternating between both eyes but he became concerned after developing severe bilateral headache, scalp tenderness and periorbital "pressure" around the right eye. He developed a "shade/curtain" over his vision starting from his inferior visual field and extending upwards, excluding the most superior portion of his vision. Went to PCP and was found to have elevated inflammatory markers. Was started on 60mg prednisone for empiric treatment of GCA on 5/16 with resolution of symptoms. Pt has elevated outpatient ESR/CRP, s/p negative temporal artery biopsy. Received also 1x of solumedrol 250 mg. Op    CT head 5/20: Mild volume loss, microvascular disease, no acute hemorrhage or midline shift.  CTA head and neck 5/20: Patent intracranial circulation without flow limiting stenosis. Patent, ECAs, ICAs, no  hemodynamically significant stenosis.  Bilateral vertebral arteries are patent without flow limiting stenosis.  MRI Brain and orbits w/w/o 5/22: No acute findings in the brain or orbits. No infarcts. No abnormal mural thickening or enhancement of the superficial temporal arteries. Small subcortical white matter focus likely chronic microvascular ischemic changes  TTE w/ bubble: EF 67%, negative for PFO.    Saw Dr. 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 Negrete provided reassurance and set up for return to clinic in 08/2024 and MRI Brain with and without contrast in July or August 2024.     NIHSS 0  LKW: 06:00   mRS 0    Not a tpa candidate given symptom resolution. Not a thrombectomy candidate given no large vessel occlusion on CTA H/N.      Review of Systems:  NEUROLOGICAL: +As stated in HPI above    All other review of systems is negative unless indicated above.    Allergies:  No Known Drug Allergies  dust (Sneezing; Rhinorrhea)  Grass; Mold (Sneezing; Rhinorrhea)      PMHx/PSHx/Family Hx: As above, otherwise see below   MVP (mitral valve prolapse)    Right knee pain, unspecified chronicity    Facial palsy        Social Hx:  No current use of tobacco, alcohol, or illicit drugs  Lives with ***    Medications:  MEDICATIONS  (STANDING):    MEDICATIONS  (PRN):      Vitals:  T(C): 36.6 (07-29-24 @ 08:49), Max: 36.6 (07-29-24 @ 08:49)  HR: 62 (07-29-24 @ 08:49) (62 - 62)  BP: 145/83 (07-29-24 @ 08:49) (145/83 - 145/83)  RR: 18 (07-29-24 @ 08:49) (18 - 18)  SpO2: 100% (07-29-24 @ 08:49) (100% - 100%)    Physical Examination: INCOMPLETE  General - NAD  Cardiovascular - Peripheral pulses palpable, no edema  Eyes - Fundoscopy with flat, sharp optic discs and no hemorrhage or exudates; Fundoscopy not well visualized; Fundoscopy not performed due to safety precautions in the setting of the COVID-19 pandemic    Neurologic Exam:  Mental status - Awake, Alert, Oriented to person, place, and time. Speech fluent, repetition and naming intact. Follows simple and complex commands. Attention/concentration, recent and remote memory (including registration and recall), and fund of knowledge intact    Cranial nerves - PERRLA, VFF, EOMI, face sensation (V1-V3) intact b/l, facial strength intact without asymmetry b/l, hearing intact b/l, palate with symmetric elevation, trapezius OR sternocleidomastiod 5/5 strength b/l, tongue midline on protrusion with full lateral movement    Motor - Normal bulk and tone throughout. No pronator drift.  Strength testing            Deltoid      Biceps      Triceps     Wrist Extension    Wrist Flexion     Interossei         R            5                 5               5                     5                              5                        5                 5  L             5                 5               5                     5                              5                        5                 5              Hip Flexion    Hip Extension    Knee Flexion    Knee Extension    Dorsiflexion    Plantar Flexion  R              5                           5                       5                           5                            5                          5  L              5                           5                        5                           5                            5                          5    Sensation - Light touch/temperature OR pain/vibration intact throughout    DTR's -             Biceps      Triceps     Brachioradialis      Patellar    Ankle    Toes/plantar response  R             2+             2+                  2+                       2+            2+                 Down  L              2+             2+                 2+                        2+           2+                 Down    Coordination - Finger to Nose intact b/l. No tremors appreciated    Gait and station - Normal casual gait. Romberg (-)    Labs:                        14.9   3.27  )-----------( 219      ( 29 Jul 2024 09:08 )             45.2           CAPILLARY BLOOD GLUCOSE      POCT Blood Glucose.: 76 mg/dL (29 Jul 2024 08:48)          CSF:                  Radiology:     Neurology - Consult Note    -  Spectra: [phone] ([facility]), [phone] ([facility])  -    HPI: Patient ROSALEE MUELLER is a 59y (1965) man with a PMHx significant for anxiety, previous episode of suspected amarousis fugax in 05/2023 w/ R eye (seen by outpatient neurologist Dr. Negrete, most recently 05/2024), who presented today with similar episode of visual field "spottiness" in the inferior visual field quadrants of the L eye this time. Of note this episode started around 6:30, patient was in usual state of health at 06:00 today. He states this is similar to his previous episode in 05/2023 but in the L eye, and not associated with headache. At time of evaluation today, he endorses that vision is completely back to normal. He was anxious and endorsed that he is very nervous at baseline. He endorses intermittent parasthesias periorally, but states that he knows this could be related to hyperventilation. NIHSS 0 on my exam. At the time of his R eye symptoms last year, he underwent extensive work-up with Optho for GCA, s/p temporal artery biopsy 5/17 with Dr. Ernandez. He had prior to 05/2023 previous similar episodes over the past 1-2 years (2-4 episodes) alternating between both eyes but he became concerned after developing severe bilateral headache, scalp tenderness and periorbital "pressure" around the right eye. He developed a "shade/curtain" over his vision starting from his inferior visual field and extending upwards, excluding the most superior portion of his vision. Went to PCP and was found to have elevated inflammatory markers. Was started on 60mg prednisone for empiric treatment of GCA on 5/16 with resolution of symptoms. Pt has elevated outpatient ESR/CRP, s/p negative temporal artery biopsy. Received also 1x of solumedrol 250 mg. Op    CT head 5/20: Mild volume loss, microvascular disease, no acute hemorrhage or midline shift.  CTA head and neck 5/20: Patent intracranial circulation without flow limiting stenosis. Patent, ECAs, ICAs, no  hemodynamically significant stenosis.  Bilateral vertebral arteries are patent without flow limiting stenosis.  MRI Brain and orbits w/w/o 5/22: No acute findings in the brain or orbits. No infarcts. No abnormal mural thickening or enhancement of the superficial temporal arteries. Small subcortical white matter focus likely chronic microvascular ischemic changes  TTE w/ bubble: EF 67%, negative for PFO.    Saw Dr. 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 Negrete provided reassurance and set up for return to clinic in 08/2024 and MRI Brain with and without contrast in July or August 2024.     NIHSS 0  LKW: 06:00   mRS 0    CT Head and CTA H/N done today 7/29 were unremarkable.     Not a tpa candidate given symptom resolution. Not a thrombectomy candidate given no large vessel occlusion on CTA H/N.      Review of Systems:  NEUROLOGICAL: +As stated in HPI above    All other review of systems is negative unless indicated above.    Allergies:  No Known Drug Allergies  dust (Sneezing; Rhinorrhea)  Grass; Mold (Sneezing; Rhinorrhea)      PMHx/PSHx/Family Hx: As above, otherwise see below   MVP (mitral valve prolapse)    Right knee pain, unspecified chronicity    Facial palsy        Social Hx:  No current use of tobacco, alcohol, or illicit drugs  Lives with ***    Medications:  MEDICATIONS  (STANDING):    MEDICATIONS  (PRN):      Vitals:  T(C): 36.6 (07-29-24 @ 08:49), Max: 36.6 (07-29-24 @ 08:49)  HR: 62 (07-29-24 @ 08:49) (62 - 62)  BP: 145/83 (07-29-24 @ 08:49) (145/83 - 145/83)  RR: 18 (07-29-24 @ 08:49) (18 - 18)  SpO2: 100% (07-29-24 @ 08:49) (100% - 100%)    Physical Examination: INCOMPLETE  General - NAD  Cardiovascular - Peripheral pulses palpable, no edema  Eyes - Fundoscopy with flat, sharp optic discs and no hemorrhage or exudates; Fundoscopy not well visualized; Fundoscopy not performed due to safety precautions in the setting of the COVID-19 pandemic    Neurologic Exam:  Mental status - Awake, Alert, Oriented to person, place, and time. Speech fluent, repetition and naming intact. Follows simple and complex commands. Attention/concentration, recent and remote memory (including registration and recall), and fund of knowledge intact    Cranial nerves - PERRLA, VFF, EOMI, face sensation (V1-V3) intact b/l, facial strength intact without asymmetry b/l, hearing intact b/l, palate with symmetric elevation, trapezius OR sternocleidomastiod 5/5 strength b/l, tongue midline on protrusion with full lateral movement    Motor - Normal bulk and tone throughout. No pronator drift.  Strength testing            Deltoid      Biceps      Triceps     Wrist Extension    Wrist Flexion     Interossei         R            5                 5               5                     5                              5                        5                 5  L             5                 5               5                     5                              5                        5                 5              Hip Flexion    Hip Extension    Knee Flexion    Knee Extension    Dorsiflexion    Plantar Flexion  R              5                           5                       5                           5                            5                          5  L              5                           5                        5                           5                            5                          5    Sensation - Light touch/temperature OR pain/vibration intact throughout    DTR's -             Biceps      Triceps     Brachioradialis      Patellar    Ankle    Toes/plantar response  R             2+             2+                  2+                       2+            2+                 Down  L              2+             2+                 2+                        2+           2+                 Down    Coordination - Finger to Nose intact b/l. No tremors appreciated    Gait and station - Normal casual gait. Romberg (-)    Labs:                        14.9   3.27  )-----------( 219      ( 29 Jul 2024 09:08 )             45.2           CAPILLARY BLOOD GLUCOSE      POCT Blood Glucose.: 76 mg/dL (29 Jul 2024 08:48)          CSF:                  Radiology:     Neurology - Consult Note    -  Spectra: [phone] ([facility]), [phone] ([facility])  -    HPI: Patient ROSALEE MUELLER is a 59y (1965) man with a PMHx significant for anxiety, previous episode of suspected amarousis fugax in 05/2023 w/ R eye (seen by outpatient neurologist Dr. Negrete, most recently 05/2024), who presented today with similar episode of visual field "spottiness" in the inferior visual field quadrants of the L eye this time. Of note this episode started around 6:30, patient was in usual state of health at 06:00 today. He states this is similar to his previous episode in 05/2023 but in the L eye, and not associated with headache. At time of evaluation today, he endorses that vision is completely back to normal. He was anxious and endorsed that he is very nervous at baseline. He endorses intermittent parasthesias periorally, but states that he knows this could be related to hyperventilation. NIHSS 0 on my exam. At the time of his R eye symptoms last year, he underwent extensive work-up with Optho for GCA, s/p temporal artery biopsy 5/17 with Dr. Ernandez. He had prior to 05/2023 previous similar episodes over the past 1-2 years (2-4 episodes) alternating between both eyes but he became concerned after developing severe bilateral headache, scalp tenderness and periorbital "pressure" around the right eye. He developed a "shade/curtain" over his vision starting from his inferior visual field and extending upwards, excluding the most superior portion of his vision. Went to PCP and was found to have elevated inflammatory markers. Was started on 60mg prednisone for empiric treatment of GCA on 5/16 with resolution of symptoms. Pt has elevated outpatient ESR/CRP, s/p negative temporal artery biopsy. Received also 1x of solumedrol 250 mg. Op    CT head 5/20: Mild volume loss, microvascular disease, no acute hemorrhage or midline shift.  CTA head and neck 5/20: Patent intracranial circulation without flow limiting stenosis. Patent, ECAs, ICAs, no  hemodynamically significant stenosis.  Bilateral vertebral arteries are patent without flow limiting stenosis.  MRI Brain and orbits w/w/o 5/22: No acute findings in the brain or orbits. No infarcts. No abnormal mural thickening or enhancement of the superficial temporal arteries. Small subcortical white matter focus likely chronic microvascular ischemic changes  TTE w/ bubble: EF 67%, negative for PFO.    Saw Dr. 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 Negrete provided reassurance and set up for return to clinic in 08/2024 and MRI Brain with and without contrast in July or August 2024.     NIHSS 0  LKW: 06:00   mRS 0    CT Head and CTA H/N done today 7/29 were unremarkable.     Not a tpa candidate given symptom resolution. Not a thrombectomy candidate given no large vessel occlusion on CTA H/N.      Review of Systems:  NEUROLOGICAL: +As stated in HPI above    All other review of systems is negative unless indicated above.    Allergies:  No Known Drug Allergies  dust (Sneezing; Rhinorrhea)  Grass; Mold (Sneezing; Rhinorrhea)      PMHx/PSHx/Family Hx: As above, otherwise see below   MVP (mitral valve prolapse)    Right knee pain, unspecified chronicity    Facial palsy        Social Hx:  No current use of tobacco, alcohol, or illicit drugs  Lives with ***    Medications:  MEDICATIONS  (STANDING):    MEDICATIONS  (PRN):      Vitals:  T(C): 36.6 (07-29-24 @ 08:49), Max: 36.6 (07-29-24 @ 08:49)  HR: 62 (07-29-24 @ 08:49) (62 - 62)  BP: 145/83 (07-29-24 @ 08:49) (145/83 - 145/83)  RR: 18 (07-29-24 @ 08:49) (18 - 18)  SpO2: 100% (07-29-24 @ 08:49) (100% - 100%)    Physical Examination:  Neurologic Exam:  Mental status - Awake, Alert, Oriented to person, place, and time. Speech fluent, repetition and naming intact. Follows simple and complex commands. Attention/concentration, recent and remote memory (including registration and recall), and fund of knowledge intact    Cranial nerves - PERRLA, VFF, EOMI, face sensation (V1-V3) intact b/l, facial strength intact without asymmetry b/l, hearing intact b/l, palate with symmetric elevation, trapezius  5/5 strength b/l, tongue midline on protrusion with full lateral movement    Motor - Normal bulk and tone throughout. No pronator drift.  Strength testing            Deltoid      Biceps      Triceps     Wrist Extension    Wrist Flexion     Interossei         R            5                 5               5                     5                              5                        5                 5  L             5                 5               5                     5                              5                        5                 5              Hip Flexion    Hip Extension    Knee Flexion    Knee Extension    Dorsiflexion    Plantar Flexion  R              5                           5                       5                           5                            5                          5  L              5                           5                        5                           5                            5                          5    Sensation - Light touch intact throughout    DTR's -             Biceps      Triceps     Brachioradialis      Patellar    Ankle    Toes/plantar response  R             2+             2+                  2+                       2+            2+                 Down  L              2+             2+                 2+                        2+           2+                 Down    Coordination - Finger to Nose intact b/l. No tremors appreciated        Labs:                        14.9   3.27  )-----------( 219      ( 29 Jul 2024 09:08 )             45.2           CAPILLARY BLOOD GLUCOSE      POCT Blood Glucose.: 76 mg/dL (29 Jul 2024 08:48)          CSF:                  Radiology:

## 2024-07-29 NOTE — ED PROVIDER NOTE - CLINICAL SUMMARY MEDICAL DECISION MAKING FREE TEXT BOX
58 yo male PMH anxiety presenting to ED c/o left eye vision changes at apporx 0630 this morning. Patient  woke up asymptomatic, went to go work out and began to experience "spotty" vision" in his left eye. Patient states symptoms lasted approx 7 minutes before resolving completely. Denies any headache, recent falls/trauma, weakness/numbness, difficulties speaking or ambulating, lightheadedness/dizziness. endorses some facial tingling which patient states is a common symptom of his anxiety.  has experienced similar vision changes before in right eye, is being followed by a neurologist, received an MRI in April, and was recently worked up for GCA. Patient denies chest pain, palpitations, SOB, cough, abd pain, nausea, vomiting, diarrhea,  fevers/chills. On exam A&OX4, CN intact, No focal motor deficits/weakness, no sensory deficits, no dysmetria, no slurred speech, no facial droop, no pronator drift, normal gait, PERRL, EOMI w/o pain, no nystagmus, peripheral fields intact. Code stroke initiated for visual changes, will obtain CTA head/neck to assess for ischemia Stroke vs TIA, CT head non to assess ICH. Low suspicion for GCA. Visual symptoms resolved, no suspicion for retinal detachment/acute angle closure glaucoma. Will obtain cbc for leukocytosis/anemia, cmp to assess electrolyte imbalance. Patient scheduled for outpatient MRI tomorrow, dispo pending neuro recs. 58 yo male PMH anxiety presenting to ED c/o left eye vision changes at apporx 0630 this morning. Patient  woke up asymptomatic, went to go work out and began to experience "spotty" vision" in his left eye. Patient states symptoms lasted approx 7 minutes before resolving completely. Denies any headache, recent falls/trauma, weakness/numbness, difficulties speaking or ambulating, lightheadedness/dizziness. endorses some facial tingling which patient states is a common symptom of his anxiety.  has experienced similar vision changes before in right eye, is being followed by a neurologist, received an MRI in April, and was recently worked up for GCA. Patient denies chest pain, palpitations, SOB, cough, abd pain, nausea, vomiting, diarrhea,  fevers/chills. On exam A&OX4, CN intact, No focal motor deficits/weakness, no sensory deficits, no dysmetria, no slurred speech, no facial droop, no pronator drift, normal gait, PERRL, EOMI w/o pain, no nystagmus, peripheral fields intact,  20/30 both eyes, 20/40 R eye, 20/40 L eye. Code stroke initiated for visual changes, will obtain CTA head/neck to assess for ischemia Stroke vs TIA, CT head non to assess ICH. Low suspicion for GCA given recent negative work-up/biopsy. Visual symptoms resolved, no suspicion for retinal detachment/acute angle closure glaucoma. Most likely ocular migraine vs anxiety, Will obtain cbc for leukocytosis/anemia, cmp to assess electrolyte imbalance. Patient scheduled for outpatient MRI tomorrow, dispo pending neuro recs. 60 yo male PMH anxiety presenting to ED c/o left eye vision changes at apporx 0630 this morning. Patient  woke up asymptomatic, went to go work out and began to experience "spotty" vision" in his left eye. Patient states symptoms lasted approx 7 minutes before resolving completely. Denies any headache, recent falls/trauma, weakness/numbness, difficulties speaking or ambulating, lightheadedness/dizziness. endorses some facial tingling which patient states is a common symptom of his anxiety.  has experienced similar vision changes before in right eye, is being followed by a neurologist, received an MRI in April, and was recently worked up for GCA. Patient denies chest pain, palpitations, SOB, cough, abd pain, nausea, vomiting, diarrhea,  fevers/chills. On exam A&OX4, CN intact, No focal motor deficits/weakness, no sensory deficits, no dysmetria, no slurred speech, no facial droop, no pronator drift, normal gait, PERRL, EOMI w/o pain, no nystagmus, peripheral fields intact,  20/30 both eyes, 20/40 R eye, 20/40 L eye. Code stroke initiated for visual changes, will obtain CTA head/neck to assess for ischemia Stroke vs TIA, CT head non to assess ICH. Low suspicion for GCA given recent negative work-up/biopsy. Visual symptoms resolved, no suspicion for retinal detachment/acute angle closure glaucoma. Most likely ocular migraine vs anxiety, Will obtain cbc for leukocytosis/anemia, cmp to assess electrolyte imbalance. Patient scheduled for outpatient MRI tomorrow, dispo pending neuro recs.    Attending Braulio: See attending attestation.

## 2024-07-29 NOTE — ED PROVIDER NOTE - ATTENDING APP SHARED VISIT CONTRIBUTION OF CARE
Attending Braulio: I performed a history and physical exam of the patient and discussed their management with the AVE. I have reviewed the AVE note and agree with the documented findings and plan of care, except as noted. This was a shared visit with an AVE. I reviewed and verified the documentation and independently performed my own history/exam/medical decision making. My medical decision making and observations are found below. Please refer to any progress notes for updates on clinical course. My notes supersedes the above AVE note in case of discrepancy    MDM:  60 y/o M w/ PMH of anxiety presenting to ED c/o left eye vision changes starting this morning. Patient states woke up asymptomatic, went to go work out and began to experience "spotty" vision" in his left eye. This occurred around 6:30am. Patient states symptoms lasted approx 7 minutes before resolving completely. Denies any headache, recent falls/trauma, weakness/numbness, difficulties speaking or ambulating, lightheadedness/dizziness. endorses some facial tingling which patient states is a common symptom of his anxiety.  has experienced similar vision changes before in right eye, is being followed by a neurologist, Last episode May 2023, worked up w/ CT scans and MRIs. Was treated for concern for giant cell arteritis w/ steroids. Denies fevers, chills, headache, dizziness, chest pain, cough, shortness of breath, abdominal pain, n/v/d/c, urinary symptoms, MSK pain, rash. Pt overall well appearing, no acute distress. Lungs clear. HR regular. Abd nondistended/soft/nontender. Non focal neuro exam. Calm and cooperative. Visual acuity 20/40 L eye, 20/40 R eye. Code stroke on arrival. Will f/u code stroke labs/imaging and neuro recs. May require ophtho consult, however pt reporting symptoms resolved and currently vision is at baseline. Less likely GCA as symptoms self resovled. Plan for labs, imaging, EKG, meds PRN. Will reassess the need for additional interventions as clinically warranted. Refer to any progress notes for updates on clinical course and as a continuation of this MDM.     I, Dr. Jesse Ramsey, independently interpreted the EKG which showed Sinus bradycardia at a rate of 53.

## 2024-07-29 NOTE — ED PROVIDER NOTE - NSFOLLOWUPCLINICS_GEN_ALL_ED_FT
[facility] - Ophthalmology  Ophthalmology  [address]  [place] [address]  Phone: [phone]  Fax:

## 2024-07-29 NOTE — ED PROVIDER NOTE - PROGRESS NOTE DETAILS
CAMILA Luna NP: CT negative, neuro states MRI can be obtained outpatient, has MRI scheduled for tomorrow, Patient recurrently asymptomatic. Optho spoken to via phone, will see patient outpatient in clinic. Patient updated on results and is agreeable to plan. Questions answered, patient verbalizes understanding. Return precautions given. CAMILA Luna NP: CT negative, neuro states MRI can be obtained outpatient, has MRI scheduled for tomorrow, Patient recurrently asymptomatic. Optho spoken to via phone, will see patient outpatient in clinic. Patient updated on results and is agreeable to plan. Questions answered, patient verbalizes understanding. Return precautions given.    Attending Nello: agree w/ above. opho to see pt in clinic today, pt instructed to go directly to ophtho clinic once DC'ed from ED. pt agreeable.

## 2024-08-12 ENCOUNTER — APPOINTMENT (OUTPATIENT)
Dept: NEUROLOGY | Facility: CLINIC | Age: 59
End: 2024-08-12

## 2024-08-30 ENCOUNTER — TRANSCRIPTION ENCOUNTER (OUTPATIENT)
Age: 59
End: 2024-08-30

## 2024-08-30 DIAGNOSIS — G93.89 OTHER SPECIFIED DISORDERS OF BRAIN: ICD-10-CM

## 2024-09-10 ENCOUNTER — NON-APPOINTMENT (OUTPATIENT)
Age: 59
End: 2024-09-10

## 2024-09-11 ENCOUNTER — APPOINTMENT (OUTPATIENT)
Dept: NEUROLOGY | Facility: CLINIC | Age: 59
End: 2024-09-11
Payer: COMMERCIAL

## 2024-09-11 VITALS
OXYGEN SATURATION: 99 % | HEART RATE: 65 BPM | RESPIRATION RATE: 16 BRPM | TEMPERATURE: 98.2 F | SYSTOLIC BLOOD PRESSURE: 132 MMHG | DIASTOLIC BLOOD PRESSURE: 82 MMHG | BODY MASS INDEX: 27.83 KG/M2 | WEIGHT: 210 LBS | HEIGHT: 73 IN

## 2024-09-11 DIAGNOSIS — E34.8 OTHER SPECIFIED ENDOCRINE DISORDERS: ICD-10-CM

## 2024-09-11 PROCEDURE — 99214 OFFICE O/P EST MOD 30 MIN: CPT

## 2024-09-11 NOTE — PHYSICAL EXAM
[FreeTextEntry1] :  Exam as below (with documented exceptions in parentheses):  General:  Healthy appearing man well groomed and without deformities. KPS is 90  Mental Status:  Awake, alert and attentive. Oriented to person, place and time. Recent and remote memory intact. Normal concentration. Fluent spontaneous speech with intact naming and repetition. Normal fund of knowledge.    Cranial Nerves: II: Full visual fields. III,IV,VI:Pupils round, reactive to light. Full extraocular movements. No nystagmus. V: Normal bilateral bite, facial sensation. VII: No facial weakness. VIII: Hearing intact. IX,X: Palate midline, intact gag. XI:  Sternocleidomastoids normal. XII: Tongue protrudes midline. No dysarthria.   Motor:  Normal tone, bulk and power throughout including arms and legs, proximal and distal. No pronator drift. No abnormal movements.   Sensation: Normal in arms, legs and trunk to pin, proprioception and vibration. Negative Romberg.   Coordination: No dysmetria or dysdiadochokinesis bilaterally. Normal heel-shin testing.   Gait: Normal including heel and toe walking. Normal station.   Reflexes: Normoactive and symmetric throughout. Absent Babinski bilaterally.   Vascular: No peripheral edema/calf tenderness.   Eyes: Funduscopic exam without papilledema (deferred)  Heart: Regular rate and rhythm. Normal s1-s2. No murmurs, rubs or gallops.   Respiratory: Lungs clear to auscultation bilaterally.   Abdomen: Nontender, non-distended. No hepatosplenomegaly. Normal bowel sounds in four quadrants.

## 2024-09-11 NOTE — DISCUSSION/SUMMARY
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  I reassured him there was no intervention at present.  New MRI brain with gado coordinated for 1 year RTC 1 yr

## 2024-09-11 NOTE — HISTORY OF PRESENT ILLNESS
[FreeTextEntry1] : NEURO-ONCOLOGY  This 59 year old right handed man is referred by Dr. Negrete for my advice and opinion regarding pineal cyst  He presented in 2023 with transient monocular vision loss and elevated inflammatory markers.  He was started on steroids, but TA biopsy negative.  He has not had further symptoms.  Workup revealed pineal cyst.   Followup MRI recently done for tinnitus (non contrast, open MRI) showed stable lesion.   No headaches, diplopia or imbalance.   Tinnitus improving.    PMH:  as above PSH: as above SHX:   . nonsmoker. nondrinker.   FHX:  sister has a pineal cyst.   no brain tumors.  no history of sarcoid

## 2024-09-11 NOTE — DATA REVIEWED
[de-identified] : Serial MRI brain 8/24 (non contrast) reviewed and compared with 5/23, showing no change to pineal cyst I did discuss scan with neuroradiology today.

## 2024-09-27 ENCOUNTER — APPOINTMENT (OUTPATIENT)
Dept: NEUROLOGY | Facility: CLINIC | Age: 59
End: 2024-09-27

## 2025-05-06 NOTE — H&P ADULT - NSHPPOAPULMEMBOLUS_GEN_A_CORE
Show Applicator Variable?: Yes Number Of Freeze-Thaw Cycles: 1 freeze-thaw cycle Spray Paint Technique: No Spray Paint Text: The liquid nitrogen was applied to the skin utilizing a spray paint frosting technique. Medical Necessity Clause: This procedure was medically necessary because the lesions that were treated were: Medical Necessity Information: It is in your best interest to select a reason for this procedure from the list below. All of these items fulfill various CMS LCD requirements except the new and changing color options. Consent: The patient's consent was obtained including but not limited to risks of crusting, scabbing, blistering, scarring, darker or lighter pigmentary change, recurrence, incomplete removal and infection. Detail Level: Detailed Duration Of Freeze Thaw-Cycle (Seconds): 5-10 Post-Care Instructions: I reviewed with the patient in detail post-care instructions. Patient is to wear sunprotection, and avoid picking at any of the treated lesions. Pt may apply Vaseline to crusted or scabbing areas. no

## 2025-05-08 NOTE — PROGRESS NOTE ADULT - SUBJECTIVE AND OBJECTIVE BOX
Care Due:                  Date            Visit Type   Department     Provider  --------------------------------------------------------------------------------                                EP -                              [facility] FAM[facility]  Last Visit: 03-      CARE ([facility])   ELADIO Hood                               -                              [facility]  Next Visit: 07-      CARE ([facility])   ELADIO Hood                                                            Last  Test          Frequency    Reason                     Performed    Due Date  --------------------------------------------------------------------------------    CBC.........  12 months..  meloxicam................  05- 05-    CMP.........  12 months..  meloxicam, metFORMIN,      05- 05-                             olmesartan...............    [facility] Embedded Care Due Messages. Reference number: [id number].   5/08/2025 5:25:38 PM CDT   DATE OF SERVICE: 05-21-23 @ 08:25  CHIEF COMPLAINT:Patient is a 57y old  Male who presents with a chief complaint of   	        PAST MEDICAL & SURGICAL HISTORY:  MVP (mitral valve prolapse)  "slight"      Right knee pain, unspecified chronicity      Facial palsy      S/P colonoscopy with polypectomy      S/P arthroscopy of left knee  2013      S/P arthroscopy of right knee  2002      History of knee surgery  Open left knee 1985      S/P arthroscopy of shoulder  right 2000      Fracture of right ankle  s/p repair 1997              REVIEW OF SYSTEMS:  CONSTITUTIONAL: No fever, weight loss, or fatigue  EYES: No eye pain, visual disturbances, or discharge  NECK: No pain or stiffness  RESPIRATORY: No cough, wheezing, chills or hemoptysis; No Shortness of Breath  CARDIOVASCULAR: No chest pain, palpitations, passing out, dizziness, or leg swelling  GASTROINTESTINAL: No abdominal or epigastric pain. No nausea, vomiting, or hematemesis; No diarrhea or constipation. No melena or hematochezia.  GENITOURINARY: No dysuria, frequency, hematuria, or incontinence  NEUROLOGICAL: No headaches, memory loss, loss of strength, numbness, or tremors    MUSCULOSKELETAL: No joint pain or swelling; No muscle, back, or extremity pain    Medications:  MEDICATIONS  (STANDING):  pantoprazole    Tablet 40 milliGRAM(s) Oral before breakfast    MEDICATIONS  (PRN):    	    PHYSICAL EXAM:  T(C): 36.8 (05-21-23 @ 05:25), Max: 37 (05-20-23 @ 13:15)  HR: 57 (05-21-23 @ 05:25) (57 - 65)  BP: 128/73 (05-21-23 @ 05:25) (128/73 - 165/94)  RR: 18 (05-21-23 @ 05:25) (16 - 19)  SpO2: 99% (05-21-23 @ 05:25) (96% - 99%)  Wt(kg): --  I&O's Summary      Appearance: Normal	  HEENT:   Normal oral mucosa, PERRL, EOMI	  Lymphatic: No lymphadenopathy  Cardiovascular: Normal S1 S2, No JVD, No murmurs, No edema  Respiratory: Lungs clear to auscultation	  Psychiatry: A & O x 3, Mood & affect appropriate  Gastrointestinal:  Soft, Non-tender, + BS	  Skin: No rashes, No ecchymoses, No cyanosis	  Neurologic: Non-focal  Extremities: Normal range of motion, No clubbing, cyanosis or edema  Vascular: Peripheral pulses palpable 2+ bilaterally    TELEMETRY: 	    ECG:  	  RADIOLOGY:  OTHER: 	  	  LABS:	 	    CARDIAC MARKERS:                                14.4   9.46  )-----------( 301      ( 20 May 2023 13:36 )             42.9     05-20    138  |  101  |  15  ----------------------------<  122<H>  3.6   |  25  |  0.98    Ca    9.2      20 May 2023 13:36    TPro  7.5  /  Alb  4.1  /  TBili  0.3  /  DBili  x   /  AST  18  /  ALT  19  /  AlkPhos  85  05-20    proBNP:   Lipid Profile:   HgA1c:   TSH:

## 2025-09-10 ENCOUNTER — APPOINTMENT (OUTPATIENT)
Dept: NEUROLOGY | Facility: CLINIC | Age: 60
End: 2025-09-10
Payer: COMMERCIAL

## 2025-09-10 ENCOUNTER — APPOINTMENT (OUTPATIENT)
Dept: MRI IMAGING | Facility: IMAGING CENTER | Age: 60
End: 2025-09-10
Payer: COMMERCIAL

## 2025-09-10 DIAGNOSIS — E34.8 OTHER SPECIFIED ENDOCRINE DISORDERS: ICD-10-CM

## 2025-09-10 PROCEDURE — 99213 OFFICE O/P EST LOW 20 MIN: CPT

## 2025-09-10 PROCEDURE — 70553 MRI BRAIN STEM W/O & W/DYE: CPT | Mod: 26

## 2025-09-17 ENCOUNTER — TRANSCRIPTION ENCOUNTER (OUTPATIENT)
Age: 60
End: 2025-09-17